# Patient Record
Sex: MALE | Race: WHITE | NOT HISPANIC OR LATINO | URBAN - METROPOLITAN AREA
[De-identification: names, ages, dates, MRNs, and addresses within clinical notes are randomized per-mention and may not be internally consistent; named-entity substitution may affect disease eponyms.]

---

## 2019-08-09 ENCOUNTER — INPATIENT (INPATIENT)
Facility: HOSPITAL | Age: 84
LOS: 3 days | Discharge: NURSING, REHAB OR LONG-TERM CARE | DRG: 193 | End: 2019-08-13
Attending: INTERNAL MEDICINE | Admitting: INTERNAL MEDICINE
Payer: MEDICARE

## 2019-08-09 VITALS
DIASTOLIC BLOOD PRESSURE: 100 MMHG | OXYGEN SATURATION: 85 % | TEMPERATURE: 99 F | HEART RATE: 79 BPM | RESPIRATION RATE: 22 BRPM | SYSTOLIC BLOOD PRESSURE: 198 MMHG

## 2019-08-09 DIAGNOSIS — J18.9 PNEUMONIA, UNSPECIFIED ORGANISM: ICD-10-CM

## 2019-08-09 DIAGNOSIS — G45.9 TRANSIENT CEREBRAL ISCHEMIC ATTACK, UNSPECIFIED: ICD-10-CM

## 2019-08-09 DIAGNOSIS — I21.21 ST ELEVATION (STEMI) MYOCARDIAL INFARCTION INVOLVING LEFT CIRCUMFLEX CORONARY ARTERY: ICD-10-CM

## 2019-08-09 DIAGNOSIS — J44.9 CHRONIC OBSTRUCTIVE PULMONARY DISEASE, UNSPECIFIED: ICD-10-CM

## 2019-08-09 DIAGNOSIS — I50.30 UNSPECIFIED DIASTOLIC (CONGESTIVE) HEART FAILURE: ICD-10-CM

## 2019-08-09 DIAGNOSIS — E11.9 TYPE 2 DIABETES MELLITUS WITHOUT COMPLICATIONS: ICD-10-CM

## 2019-08-09 DIAGNOSIS — J96.91 RESPIRATORY FAILURE, UNSPECIFIED WITH HYPOXIA: ICD-10-CM

## 2019-08-09 DIAGNOSIS — S42.009A FRACTURE OF UNSPECIFIED PART OF UNSPECIFIED CLAVICLE, INITIAL ENCOUNTER FOR CLOSED FRACTURE: ICD-10-CM

## 2019-08-09 DIAGNOSIS — E87.1 HYPO-OSMOLALITY AND HYPONATREMIA: ICD-10-CM

## 2019-08-09 DIAGNOSIS — I10 ESSENTIAL (PRIMARY) HYPERTENSION: ICD-10-CM

## 2019-08-09 DIAGNOSIS — Z91.89 OTHER SPECIFIED PERSONAL RISK FACTORS, NOT ELSEWHERE CLASSIFIED: ICD-10-CM

## 2019-08-09 DIAGNOSIS — R63.8 OTHER SYMPTOMS AND SIGNS CONCERNING FOOD AND FLUID INTAKE: ICD-10-CM

## 2019-08-09 DIAGNOSIS — N40.0 BENIGN PROSTATIC HYPERPLASIA WITHOUT LOWER URINARY TRACT SYMPTOMS: ICD-10-CM

## 2019-08-09 DIAGNOSIS — K57.92 DIVERTICULITIS OF INTESTINE, PART UNSPECIFIED, WITHOUT PERFORATION OR ABSCESS WITHOUT BLEEDING: Chronic | ICD-10-CM

## 2019-08-09 DIAGNOSIS — Z98.1 ARTHRODESIS STATUS: Chronic | ICD-10-CM

## 2019-08-09 LAB
ACANTHOCYTES BLD QL SMEAR: SLIGHT — SIGNIFICANT CHANGE UP
ALBUMIN SERPL ELPH-MCNC: 3.8 G/DL — SIGNIFICANT CHANGE UP (ref 3.3–5)
ALP SERPL-CCNC: 129 U/L — HIGH (ref 40–120)
ALT FLD-CCNC: 27 U/L — SIGNIFICANT CHANGE UP (ref 10–45)
ANION GAP SERPL CALC-SCNC: 12 MMOL/L — SIGNIFICANT CHANGE UP (ref 5–17)
ANISOCYTOSIS BLD QL: SLIGHT — SIGNIFICANT CHANGE UP
APTT BLD: 29.6 SEC — SIGNIFICANT CHANGE UP (ref 27.5–36.3)
AST SERPL-CCNC: 33 U/L — SIGNIFICANT CHANGE UP (ref 10–40)
BASOPHILS # BLD AUTO: 0.02 K/UL — SIGNIFICANT CHANGE UP (ref 0–0.2)
BASOPHILS NFR BLD AUTO: 0.3 % — SIGNIFICANT CHANGE UP (ref 0–2)
BILIRUB SERPL-MCNC: 0.3 MG/DL — SIGNIFICANT CHANGE UP (ref 0.2–1.2)
BUN SERPL-MCNC: 49 MG/DL — HIGH (ref 7–23)
BURR CELLS BLD QL SMEAR: SIGNIFICANT CHANGE UP
CALCIUM SERPL-MCNC: 10.4 MG/DL — SIGNIFICANT CHANGE UP (ref 8.4–10.5)
CHLORIDE SERPL-SCNC: 93 MMOL/L — LOW (ref 96–108)
CK MB CFR SERPL CALC: 4.7 NG/ML — SIGNIFICANT CHANGE UP (ref 0–6.7)
CO2 SERPL-SCNC: 26 MMOL/L — SIGNIFICANT CHANGE UP (ref 22–31)
CREAT SERPL-MCNC: 1.13 MG/DL — SIGNIFICANT CHANGE UP (ref 0.5–1.3)
ELLIPTOCYTES BLD QL SMEAR: SLIGHT — SIGNIFICANT CHANGE UP
EOSINOPHIL # BLD AUTO: 0.09 K/UL — SIGNIFICANT CHANGE UP (ref 0–0.5)
EOSINOPHIL NFR BLD AUTO: 0.9 % — SIGNIFICANT CHANGE UP (ref 0–6)
EOSINOPHIL NFR BLD AUTO: 1.3 % — SIGNIFICANT CHANGE UP (ref 0–6)
GAS PNL BLDA: SIGNIFICANT CHANGE UP
GAS PNL BLDV: SIGNIFICANT CHANGE UP
GLUCOSE SERPL-MCNC: 179 MG/DL — HIGH (ref 70–99)
HCT VFR BLD CALC: 27.7 % — LOW (ref 39–50)
HGB BLD-MCNC: 8.6 G/DL — LOW (ref 13–17)
IMM GRANULOCYTES NFR BLD AUTO: 0.4 % — SIGNIFICANT CHANGE UP (ref 0–1.5)
INR BLD: 1.17 — HIGH (ref 0.88–1.16)
LACTATE SERPL-SCNC: 1.2 MMOL/L — SIGNIFICANT CHANGE UP (ref 0.5–2)
LYMPHOCYTES # BLD AUTO: 0.64 K/UL — LOW (ref 1–3.3)
LYMPHOCYTES # BLD AUTO: 6.9 % — LOW (ref 13–44)
LYMPHOCYTES # BLD AUTO: 9.5 % — LOW (ref 13–44)
MANUAL SMEAR VERIFICATION: SIGNIFICANT CHANGE UP
MCHC RBC-ENTMCNC: 27.6 PG — SIGNIFICANT CHANGE UP (ref 27–34)
MCHC RBC-ENTMCNC: 31 GM/DL — LOW (ref 32–36)
MCV RBC AUTO: 88.8 FL — SIGNIFICANT CHANGE UP (ref 80–100)
MONOCYTES # BLD AUTO: 0.38 K/UL — SIGNIFICANT CHANGE UP (ref 0–0.9)
MONOCYTES NFR BLD AUTO: 4.3 % — SIGNIFICANT CHANGE UP (ref 2–14)
MONOCYTES NFR BLD AUTO: 5.7 % — SIGNIFICANT CHANGE UP (ref 2–14)
NEUTROPHILS # BLD AUTO: 5.55 K/UL — SIGNIFICANT CHANGE UP (ref 1.8–7.4)
NEUTROPHILS NFR BLD AUTO: 67 % — SIGNIFICANT CHANGE UP (ref 43–77)
NEUTROPHILS NFR BLD AUTO: 82.8 % — HIGH (ref 43–77)
NEUTS BAND # BLD: 20.9 % — HIGH
NRBC # BLD: 0 /100 WBCS — SIGNIFICANT CHANGE UP (ref 0–0)
NT-PROBNP SERPL-SCNC: 7977 PG/ML — HIGH (ref 0–300)
OVALOCYTES BLD QL SMEAR: SLIGHT — SIGNIFICANT CHANGE UP
PLAT MORPH BLD: ABNORMAL
PLATELET # BLD AUTO: 188 K/UL — SIGNIFICANT CHANGE UP (ref 150–400)
POIKILOCYTOSIS BLD QL AUTO: SIGNIFICANT CHANGE UP
POLYCHROMASIA BLD QL SMEAR: SLIGHT — SIGNIFICANT CHANGE UP
POTASSIUM SERPL-MCNC: 5.2 MMOL/L — SIGNIFICANT CHANGE UP (ref 3.5–5.3)
POTASSIUM SERPL-SCNC: 5.2 MMOL/L — SIGNIFICANT CHANGE UP (ref 3.5–5.3)
PROT SERPL-MCNC: 6.7 G/DL — SIGNIFICANT CHANGE UP (ref 6–8.3)
PROTHROM AB SERPL-ACNC: 13.3 SEC — HIGH (ref 10–12.9)
RBC # BLD: 3.12 M/UL — LOW (ref 4.2–5.8)
RBC # FLD: 13.7 % — SIGNIFICANT CHANGE UP (ref 10.3–14.5)
RBC BLD AUTO: ABNORMAL
SODIUM SERPL-SCNC: 131 MMOL/L — LOW (ref 135–145)
SPHEROCYTES BLD QL SMEAR: SLIGHT — SIGNIFICANT CHANGE UP
TROPONIN T SERPL-MCNC: 0.02 NG/ML — HIGH (ref 0–0.01)
WBC # BLD: 6.71 K/UL — SIGNIFICANT CHANGE UP (ref 3.8–10.5)
WBC # FLD AUTO: 6.71 K/UL — SIGNIFICANT CHANGE UP (ref 3.8–10.5)

## 2019-08-09 PROCEDURE — 99285 EMERGENCY DEPT VISIT HI MDM: CPT | Mod: 25,GC

## 2019-08-09 PROCEDURE — 71045 X-RAY EXAM CHEST 1 VIEW: CPT | Mod: 26

## 2019-08-09 PROCEDURE — 93010 ELECTROCARDIOGRAM REPORT: CPT

## 2019-08-09 PROCEDURE — 71275 CT ANGIOGRAPHY CHEST: CPT | Mod: 26

## 2019-08-09 PROCEDURE — 99223 1ST HOSP IP/OBS HIGH 75: CPT | Mod: GC

## 2019-08-09 PROCEDURE — 74177 CT ABD & PELVIS W/CONTRAST: CPT | Mod: 26

## 2019-08-09 RX ORDER — ACETAMINOPHEN 500 MG
1000 TABLET ORAL ONCE
Refills: 0 | Status: COMPLETED | OUTPATIENT
Start: 2019-08-09 | End: 2019-08-09

## 2019-08-09 RX ORDER — MIRABEGRON 50 MG/1
1 TABLET, EXTENDED RELEASE ORAL
Qty: 0 | Refills: 0 | DISCHARGE

## 2019-08-09 RX ORDER — ARMODAFINIL 200 MG/1
1 TABLET ORAL
Qty: 0 | Refills: 0 | DISCHARGE

## 2019-08-09 RX ORDER — LABETALOL HCL 100 MG
10 TABLET ORAL ONCE
Refills: 0 | Status: DISCONTINUED | OUTPATIENT
Start: 2019-08-09 | End: 2019-08-09

## 2019-08-09 RX ORDER — HALOPERIDOL DECANOATE 100 MG/ML
5 INJECTION INTRAMUSCULAR ONCE
Refills: 0 | Status: COMPLETED | OUTPATIENT
Start: 2019-08-09 | End: 2019-08-09

## 2019-08-09 RX ORDER — MORPHINE SULFATE 50 MG/1
1 CAPSULE, EXTENDED RELEASE ORAL ONCE
Refills: 0 | Status: DISCONTINUED | OUTPATIENT
Start: 2019-08-09 | End: 2019-08-09

## 2019-08-09 RX ORDER — SODIUM CHLORIDE 9 MG/ML
500 INJECTION INTRAMUSCULAR; INTRAVENOUS; SUBCUTANEOUS ONCE
Refills: 0 | Status: COMPLETED | OUTPATIENT
Start: 2019-08-09 | End: 2019-08-09

## 2019-08-09 RX ORDER — IPRATROPIUM/ALBUTEROL SULFATE 18-103MCG
3 AEROSOL WITH ADAPTER (GRAM) INHALATION EVERY 6 HOURS
Refills: 0 | Status: DISCONTINUED | OUTPATIENT
Start: 2019-08-09 | End: 2019-08-13

## 2019-08-09 RX ORDER — EMPAGLIFLOZIN, METFORMIN HYDROCHLORIDE 10; 1000 MG/1; MG/1
1 TABLET, EXTENDED RELEASE ORAL
Qty: 0 | Refills: 0 | DISCHARGE

## 2019-08-09 RX ORDER — LAMOTRIGINE 25 MG/1
1 TABLET, ORALLY DISINTEGRATING ORAL
Qty: 0 | Refills: 0 | DISCHARGE

## 2019-08-09 RX ORDER — FINASTERIDE 5 MG/1
1 TABLET, FILM COATED ORAL
Qty: 0 | Refills: 0 | DISCHARGE

## 2019-08-09 RX ORDER — MORPHINE SULFATE 50 MG/1
2 CAPSULE, EXTENDED RELEASE ORAL ONCE
Refills: 0 | Status: DISCONTINUED | OUTPATIENT
Start: 2019-08-09 | End: 2019-08-09

## 2019-08-09 RX ORDER — MORPHINE SULFATE 50 MG/1
2 CAPSULE, EXTENDED RELEASE ORAL EVERY 6 HOURS
Refills: 0 | Status: DISCONTINUED | OUTPATIENT
Start: 2019-08-09 | End: 2019-08-09

## 2019-08-09 RX ORDER — CLOPIDOGREL BISULFATE 75 MG/1
1 TABLET, FILM COATED ORAL
Qty: 0 | Refills: 0 | DISCHARGE

## 2019-08-09 RX ORDER — ATORVASTATIN CALCIUM 80 MG/1
1 TABLET, FILM COATED ORAL
Qty: 0 | Refills: 0 | DISCHARGE

## 2019-08-09 RX ORDER — IPRATROPIUM/ALBUTEROL SULFATE 18-103MCG
3 AEROSOL WITH ADAPTER (GRAM) INHALATION ONCE
Refills: 0 | Status: COMPLETED | OUTPATIENT
Start: 2019-08-09 | End: 2019-08-09

## 2019-08-09 RX ORDER — HYDRALAZINE HCL 50 MG
10 TABLET ORAL ONCE
Refills: 0 | Status: COMPLETED | OUTPATIENT
Start: 2019-08-09 | End: 2019-08-09

## 2019-08-09 RX ORDER — ATORVASTATIN CALCIUM 80 MG/1
80 TABLET, FILM COATED ORAL AT BEDTIME
Refills: 0 | Status: DISCONTINUED | OUTPATIENT
Start: 2019-08-09 | End: 2019-08-09

## 2019-08-09 RX ORDER — TRAZODONE HCL 50 MG
1.5 TABLET ORAL
Qty: 0 | Refills: 0 | DISCHARGE

## 2019-08-09 RX ORDER — MORPHINE SULFATE 50 MG/1
2 CAPSULE, EXTENDED RELEASE ORAL EVERY 4 HOURS
Refills: 0 | Status: DISCONTINUED | OUTPATIENT
Start: 2019-08-09 | End: 2019-08-10

## 2019-08-09 RX ORDER — ASPIRIN/CALCIUM CARB/MAGNESIUM 324 MG
325 TABLET ORAL DAILY
Refills: 0 | Status: DISCONTINUED | OUTPATIENT
Start: 2019-08-09 | End: 2019-08-09

## 2019-08-09 RX ORDER — LIDOCAINE 4 G/100G
1 CREAM TOPICAL DAILY
Refills: 0 | Status: DISCONTINUED | OUTPATIENT
Start: 2019-08-09 | End: 2019-08-12

## 2019-08-09 RX ADMIN — MORPHINE SULFATE 2 MILLIGRAM(S): 50 CAPSULE, EXTENDED RELEASE ORAL at 18:04

## 2019-08-09 RX ADMIN — Medication 400 MILLIGRAM(S): at 15:45

## 2019-08-09 RX ADMIN — Medication 3 MILLILITER(S): at 22:31

## 2019-08-09 RX ADMIN — MORPHINE SULFATE 2 MILLIGRAM(S): 50 CAPSULE, EXTENDED RELEASE ORAL at 22:22

## 2019-08-09 RX ADMIN — MORPHINE SULFATE 1 MILLIGRAM(S): 50 CAPSULE, EXTENDED RELEASE ORAL at 15:57

## 2019-08-09 RX ADMIN — MORPHINE SULFATE 2 MILLIGRAM(S): 50 CAPSULE, EXTENDED RELEASE ORAL at 22:33

## 2019-08-09 RX ADMIN — HALOPERIDOL DECANOATE 5 MILLIGRAM(S): 100 INJECTION INTRAMUSCULAR at 23:55

## 2019-08-09 RX ADMIN — Medication 1.25 MILLIGRAM(S): at 17:48

## 2019-08-09 RX ADMIN — Medication 1000 MILLIGRAM(S): at 18:05

## 2019-08-09 RX ADMIN — Medication 1.25 MILLIGRAM(S): at 13:24

## 2019-08-09 RX ADMIN — LIDOCAINE 1 PATCH: 4 CREAM TOPICAL at 18:05

## 2019-08-09 RX ADMIN — Medication 3 MILLILITER(S): at 13:24

## 2019-08-09 RX ADMIN — Medication 10 MILLIGRAM(S): at 21:55

## 2019-08-09 RX ADMIN — MORPHINE SULFATE 2 MILLIGRAM(S): 50 CAPSULE, EXTENDED RELEASE ORAL at 17:48

## 2019-08-09 RX ADMIN — SODIUM CHLORIDE 2000 MILLILITER(S): 9 INJECTION INTRAMUSCULAR; INTRAVENOUS; SUBCUTANEOUS at 12:29

## 2019-08-09 RX ADMIN — LIDOCAINE 1 PATCH: 4 CREAM TOPICAL at 17:36

## 2019-08-09 RX ADMIN — MORPHINE SULFATE 1 MILLIGRAM(S): 50 CAPSULE, EXTENDED RELEASE ORAL at 18:04

## 2019-08-09 NOTE — ED PROVIDER NOTE - PSH
Atherosclerosis of coronary artery  s/p stent placement  Diverticulitis    H/O spinal fusion    Other postprocedural status  perforated appendix, subsequent adhesion removal  Presence of cardiac pacemaker  S/P cardiac pacemaker procedure

## 2019-08-09 NOTE — ED PROVIDER NOTE - CLINICAL SUMMARY MEDICAL DECISION MAKING FREE TEXT BOX
89 y/o M PMH HTN, HLD, CAD s/p stents and pacemaker placement, COPD (not on home O2), who presents for SOB and HTN. CXR with large right sided consolidation. Increased work of breathing and using accessory muscles requiring BiPAP. Does not meet sepsis criteria. Received levaquin 750mg IV x1 and  500cc bolus. Systolic BP over 200 (did not take AM medications ) - gave enalaprilat 1.25mg IV x1. ICU consult called and recommending management on RMF as patient is DNR/DNI. Will admit to regional medical floor for further management.

## 2019-08-09 NOTE — H&P ADULT - NSHPPHYSICALEXAM_GEN_ALL_CORE
VITAL SIGNS:  T(C): 37.1 (08-09-19 @ 11:00), Max: 37.1 (08-09-19 @ 11:00)  T(F): 98.8 (08-09-19 @ 11:00), Max: 98.8 (08-09-19 @ 11:00)  HR: 87 (08-09-19 @ 14:09) (67 - 87)  BP: 198/76 (08-09-19 @ 14:09) (170/76 - 198/100)  BP(mean): --  RR: 20 (08-09-19 @ 14:09) (18 - 22)  SpO2: 100% (08-09-19 @ 14:09) (85% - 100%)  Wt(kg): --    PHYSICAL EXAM:  Constitutional: WDWN resting comfortably in bed; NAD  Head: NC/AT  Eyes: PERRL, EOMI, anicteric sclera  ENT: no nasal discharge; uvula midline, no oropharyngeal erythema or exudates; MMM  Neck: supple; no JVD or thyromegaly  Respiratory: CTA B/L; no W/R/R, no retractions  Cardiac: +S1/S2; RRR; no M/R/G; PMI non-displaced  Gastrointestinal: abdomen soft, NT/ND; no rebound or guarding; +BSx4  Back: spine midline, no bony tenderness or step-offs; no CVAT B/L  Extremities: WWP, no clubbing or cyanosis; no peripheral edema  Musculoskeletal: NROM x4; no joint swelling, tenderness or erythema  Vascular: 2+ radial, femoral, DP/PT pulses B/L  Dermatologic: skin warm, dry and intact; no rashes, wounds, or scars  Lymphatic: no submandibular or cervical LAD  Neurologic: AAOx3; CNII-XII grossly intact; no focal deficits  Psychiatric: affect and characteristics of appearance, verbalizations, behaviors are appropriate VITAL SIGNS:  T(C): 37.1 (08-09-19 @ 11:00), Max: 37.1 (08-09-19 @ 11:00)  T(F): 98.8 (08-09-19 @ 11:00), Max: 98.8 (08-09-19 @ 11:00)  HR: 87 (08-09-19 @ 14:09) (67 - 87)  BP: 198/76 (08-09-19 @ 14:09) (170/76 - 198/100)  BP(mean): --  RR: 20 (08-09-19 @ 14:09) (18 - 22)  SpO2: 100% (08-09-19 @ 14:09) (85% - 100%)  Wt(kg): --    PHYSICAL EXAM:  Constitutional: laying in mild distress, on high-flow oxygen  Head: NC/AT  Eyes: PERRL, EOMI, anicteric sclera  ENT: no nasal discharge  Neck: supple; no thyromegaly  Respiratory: transmitted breath sounds throughout lungs B/L  Cardiac: +S1/S2; RRR; no M/R/G; PMI non-displaced, PPM in place L sided chest  Gastrointestinal: abdomen soft, slightly distended, multiple firm nodules felt throughout abdomen, non-tender; no rebound or guarding; +BSx3  Back: spine midline, no bony tenderness or step-offs  Extremities: WWP, no clubbing or cyanosis; no peripheral edema, cold to the touch  Musculoskeletal: NROM x4; no joint swelling, R shoulder pain tenderness,   Vascular: 2+ radial, femoral  Dermatologic: skin warm, dry and intact; no rashes, wounds, or scars, 5-6cm ecchymosis midclavicular on R clavical  Lymphatic: no submandibular or cervical LAD  Neurologic: AAOx3; CNII-XII grossly intact; no focal deficits  Psychiatric: affect and characteristics of appearance, verbalizations, behaviors are appropriate

## 2019-08-09 NOTE — ED PROVIDER NOTE - PMH
Anxiety state  Anxiety  Atherosclerosis of coronary artery  CAD (coronary artery disease)  BPH (benign prostatic hyperplasia)    Chronic obstructive pulmonary disease  COPD (chronic obstructive pulmonary disease)  Depressive disorder  Depression  Diastolic CHF    DMII (diabetes mellitus, type 2)    Essential hypertension  HTN (hypertension)  History of arthritis  H/O: osteoarthritis  Personal history of other endocrine, metabolic, and immunity disorders  H/O hyperlipidemia  Presence of cardiac pacemaker  Cardiac pacemaker  ST elevation myocardial infarction involving left circumflex coronary artery    TIA (transient ischemic attack)    Type 2 diabetes mellitus  Diabetes

## 2019-08-09 NOTE — H&P ADULT - PROBLEM SELECTOR PLAN 4
history of COPD, with worsening cough and hypoxia Chest XR on admission showing R clavicular fracture. Patient fell from chair 5 days ago at home without LOC, did not seek medical attention as patient had full ROM at the time.   -  - Chest XR on admission showing R clavicular fracture. Patient fell from chair 5 days ago at home without LOC, did not seek medical attention as patient had full ROM at the time. CT showing R sided acute rib fractures  -  - Chest XR on admission showing R clavicular fracture. Patient fell from chair 5 days ago at home without LOC, did not seek medical attention as patient had full ROM at the time. CT showing R sided acute rib fractures  -pain control  -conservative management Chest XR on admission showing R clavicular fracture. Patient fell from chair 5 days ago at home without LOC, did not seek medical attention as patient had full ROM at the time. CT showing R sided acute rib fractures  -pain control  -conservative management  -will hold on surgical consult given family preference for comfort

## 2019-08-09 NOTE — ED PROVIDER NOTE - OBJECTIVE STATEMENT
89 y/o M with PMH HTN, HLD, CAD (s/p 2 stents), pacemaker, COPD who presents for SOB and HTN. 89 y/o M with PMH HTN, HLD, CAD (s/p 2 stents), pacemaker, COPD who presents for SOB and HTN. Daughters at bedside give history. For ~1 week he has been experiencing cough with some sputum production. No fevers at home. Additionally, over the past few days he has been more fatigued. At baseline, he is A&Ox3 however a little more confused over the past few days with some difficulty sleeping. About 10 days ago, he fell out of his chair. They called his PCP who said that he should be okay and did not have to go to the hospital. He was moving all of his extremities. Complaining of some pain in his right arm. Today, EMS was called when the patient said he was having difficulty breathing and could not get air in. His blood pressure was also elevated. EMS O2 sat 88-89% on RA.

## 2019-08-09 NOTE — H&P ADULT - PROBLEM SELECTOR PLAN 10
1) PCP Contacted on Admission: (Y/N) --> Dr. Peterson  2) Date of Contact with PCP: TBD  3) PCP Contacted at Discharge: TBD  4) Summary of Handoff Given to PCP: TBD   5) Post-Discharge Appointment Date: TBD

## 2019-08-09 NOTE — H&P ADULT - NSICDXPASTMEDICALHX_GEN_ALL_CORE_FT
PAST MEDICAL HISTORY:  Anxiety state Anxiety    Atherosclerosis of coronary artery CAD (coronary artery disease)    BPH (benign prostatic hyperplasia)     Chronic obstructive pulmonary disease COPD (chronic obstructive pulmonary disease)    Depressive disorder Depression    Diastolic CHF     DMII (diabetes mellitus, type 2)     Essential hypertension HTN (hypertension)    History of arthritis H/O: osteoarthritis    Personal history of other endocrine, metabolic, and immunity disorders H/O hyperlipidemia    Presence of cardiac pacemaker Cardiac pacemaker    ST elevation myocardial infarction involving left circumflex coronary artery     TIA (transient ischemic attack)     Type 2 diabetes mellitus Diabetes

## 2019-08-09 NOTE — H&P ADULT - PROBLEM SELECTOR PLAN 5
hyponatremic to 131  -  - hyponatremic to 131  -trend BMP history of HTN, elevated BP on admission 170-198/  -c/w enalaprilat   -currently holding PO medications in setting of BIPAP

## 2019-08-09 NOTE — ED PROVIDER NOTE - PHYSICAL EXAMINATION
Constitutional: Elderly male - appears to be sleeping. Tachypneic.   Head: NC/AT  Eyes: PERRL, EOMI, anicteric sclera  ENT: no nasal discharge; uvula midline, no oropharyngeal erythema or exudates; MMM  Neck: supple; no JVD or thyromegaly  Respiratory: Tachypneic with accessory muscle use. Diffuse rhonchi bilaterally R >L   Cardiac: +S1/S2; RRR; no M/R/G; PMI non-displaced  Gastrointestinal: abdomen soft, NT/ND; no rebound or guarding; +BSx4  Back: spine midline, no bony tenderness or step-offs; no CVAT B/L  Extremities: WWP, no clubbing or cyanosis; no peripheral edema  Musculoskeletal: NROM x4; no joint swelling, tenderness or erythema. Bruising noted over the right clavicle.   Vascular: 2+ radial, femoral, DP/PT pulses B/L  Dermatologic: skin warm, dry and intact; no rashes, wounds, or scars  Lymphatic: no submandibular or cervical LAD  Neurologic: Able to move all extremities. A&Ox1-2  Psychiatric: affect and characteristics of appearance, verbalizations, behaviors are appropriate

## 2019-08-09 NOTE — H&P ADULT - PROBLEM SELECTOR PLAN 9
F:  E:  N: 1) PCP Contacted on Admission: (Y/N) --> Dr. Peterson  2) Date of Contact with PCP: TBD  3) PCP Contacted at Discharge: TBD  4) Summary of Handoff Given to PCP: TBD   5) Post-Discharge Appointment Date: TBD F: no IVF  E: replete as needed  N: currently NPO in setting of BIPAP

## 2019-08-09 NOTE — H&P ADULT - PROBLEM SELECTOR PLAN 7
history of DM  -sliding scale history of dCHF, BNP 7977 on admission  -monitor fluid status closely  -holding PO medications in setting of BIPAP

## 2019-08-09 NOTE — H&P ADULT - NSHPSOCIALHISTORY_GEN_ALL_CORE
Patient lives with wife and a home health aid visit daily. Patient does not drink alcohol, former smoker quit smoking 30+ years ago, no illicit drug use

## 2019-08-09 NOTE — H&P ADULT - PROBLEM SELECTOR PLAN 2
Chest XR on admission showing R clavicular fracture. Patient fell from chair 5 days ago at home without LOC, did not seek medical attention as patient had full ROM at the time.   -  - patient presenting with worsening cough x1week with sputum production and shortness of breath since 1 day ago. Chest XR showing multifocal consolidation throughout R lung with small R pleural effusion. Was given levofloxacin 750mg IV  -  - patient presenting with worsening cough x1day with sputum production and shortness of breath since 1 day ago. Chest XR showing multifocal consolidation throughout R lung with small R pleural effusion. Was given levofloxacin 750mg IV  -c/w levofloxacin   - patient presenting with worsening cough x1day with sputum production and shortness of breath since 1 day ago. Chest XR showing multifocal consolidation throughout R lung with small R pleural effusion. Was given levofloxacin 750mg IV  -c/w levofloxacin   -follow cultures

## 2019-08-09 NOTE — ED PROVIDER NOTE - DIAGNOSTIC INTERPRETATION
Chest x-ray interpreted by ER Physician Dr. Alexis  Findings: heart size normal, large R sided pneumonia, no PTX, +R clavicle fracture Chest x-ray interpreted by ER Physician Dr. Alexis  Findings: heart size enlarged, large R sided pneumonia, no PTX, +R clavicle fracture

## 2019-08-09 NOTE — CONSULT NOTE ADULT - SUBJECTIVE AND OBJECTIVE BOX
89 y/o M with PMH HTN, HLD, CAD (s/p 2 stents), pacemaker, COPD who presents for SOB A 89 yo M with PMH HTN, HLD, CAD (s/p 2 stents), PPM, TIA 7/2015, Diastolic CHF (EF 65% in 2017), Dementia (AOx3 at baseline), COPD who presents for worsening cough for 1 week and SOB. Pt was in normal state of health 2 weeks ago and fell from chair after reaching for object. Family denies LOC, head trauma, no symptoms at the time. However over the past week ICU CONSULT    89M with PMH HTN, HLD, CAD (s/p 2 stents), PPM, TIA 7/2015, Diastolic CHF (EF 65% in 2017), Dementia (AOx3 at baseline), COPD (not on home O2) who presents for worsening cough for 1 week and SOB. Pt was in normal state of health 2 weeks ago and fell from chair after reaching for object. Family denies LOC, head trauma, no symptoms at the time. No further imaging was performed. However over the past week, pt with increasing fatigue, weakness per family. Symptoms associated symptoms of increased SOB x1 day and cough productive of clear nonbloody mucus. Per family, no fevers or chills, c/o HA, CP, palpitations, abdominal pain, n/v, diarrhea, constipation urinary sxs. Pt normally AAOx3 and independent living with wife at home previously. In ED, pt found to be hypoxic to 85% on room air with increased WOB. Placed on BiPAP with some improvement though increased lethargy and confusion. ICU consulted for respiratory failure.    Pt seen and examined at bedside. AAOx1 to self only. Appearing lethargic while on BiPAP. Arousable to voice and following commands appropriately. Denies CP, HA, abdominal pain, n/v. Reports improvement in his breathing and feel better since arrival.       PMHx - as per HPI  PSx - s/p hernia repair with multiple ELOY procedures    Meds - lexapro 20mg, nuvigil 150 mg, symbicort 1 puff BID, aspirin 325, lipitor 80, calcium 500, labetalol 200 BID, enalipril 10 mg qd, synjardy 12.5/500 BID, finasteride 5mg, norvasc 5 mg qd, lamictal 100 mg tramadol 325/37.5 (2 tabs daily), tamsulosin 0.8 mg qd, trazodone 150 mg qhs,  ingrezza 40 mg  Allergies - augmentin--thrombocytopenia; keflex--hives/rash   FHx - HTN  Sx - previous smoker ~1ppd, no alcohol or recreational drugs       PHYSICAL EXAM   Vital Signs Last 24 Hrs  T(C): 36.4 (09 Aug 2019 17:43), Max: 37.1 (09 Aug 2019 11:00)  T(F): 97.5 (09 Aug 2019 17:43), Max: 98.8 (09 Aug 2019 11:00)  HR: 62 (09 Aug 2019 17:43) (62 - 87)  BP: 212/73 (09 Aug 2019 17:43) (170/76 - 212/73)  BP(mean): --  RR: 18 (09 Aug 2019 17:43) (18 - 22)  SpO2: 100% (09 Aug 2019 17:43) (85% - 100%)      General - laying in bed on BiPAP appearing somewhat uncomfortable, lethargic, pale, thin, cachectic, ill-appearing  HEENT - MM dry, EOMI, PERRL, neck supple, no JVD  CV - RRR, no M/R/G  Resp - decreased brath sounds on R middle and lower lobes, L side CTA,, rhonchi and expiratory wheezing b/l, no rales; lethargic on BiPAP with minimal accessory muscle use    Abdomen - soft, mildly distended with reproducible ventral hernia on L sided abd, nontender, no rebound or guarding, +BS throughout   Extremities - no cyanosis or edema, pale nailbeds, peripheral pulses intact, cool fingers and toes   Skin - clean, dry, intact, no rashes or lesions       LABS                        8.6    6.71  )-----------( 188      ( 09 Aug 2019 11:52 )             27.7     08-09    131<L>  |  93<L>  |  49<H>  ----------------------------<  179<H>  5.2   |  26  |  1.13    Ca    10.4      09 Aug 2019 11:52    TPro  6.7  /  Alb  3.8  /  TBili  0.3  /  DBili  x   /  AST  33  /  ALT  27  /  AlkPhos  129<H>  08-09    PT/INR - ( 09 Aug 2019 11:52 )   PT: 13.3 sec;   INR: 1.17          PTT - ( 09 Aug 2019 11:52 )  PTT:29.6 sec  Lactate, Blood: 1.2 mmoL/L (08-09-19 @ 12:24)            IMAGING   CXR - R sided multifocal PNA, L clavicle fracture

## 2019-08-09 NOTE — H&P ADULT - HISTORY OF PRESENT ILLNESS
Source of information are daughters.   89 year old M with COPD, HTN, HLD, CAD (s/p 2 stents), dCHF, TIA (2015), PPM, BPH, DM2, and history of MI (2012) who presenting with shortness of breath, worsening cough, and clavicular fracture s/p mechanical fall 5 days ago. Patient fell at home from chair 5 days ago but did not go to the hospital for the incident, daughters report that he had full ROM of both shoulders. There was no LOC, daughters were not present at the time of the fall. One day ago he began to have worsening chronic cough with some sputum production. Patient complains of R shoulder pain and feeling cold. No fever, chills, chest pain, abdominal pain, or urinary symptoms. Patient has an extensive medication list. Patient lives at home with wife with a home health aid that visits daily. He uses a wheelchair to move around.   Daughters aware and in agreement with respecting patient's wishes of being DNR/DNI.     In the ED patient presented with T 98.8, HR 67-87, RR 18-22, SpO2 85 RA - 100 hi-flow. WBC 6.71, hgb 8.6, Na 131, BUN 49, Alk-P 129, BNP 7977. CT head negative, Chest XR significant for multifocal consolidation throughout R lung, small R pleural effusion. In the ED he was given enalaprilat 1.25mg IV, levofloxacin 750mg IV, 500ml NS, and duoneb. Source of information are daughters.   89 year old M with COPD, HTN, HLD, CAD (s/p 2 stents), dCHF, TIA (2015), PPM, BPH, DM2, and history of MI (2012) who presenting with shortness of breath, worsening cough, and clavicular fracture s/p mechanical fall 5 days ago. Patient fell at home from chair 5 days ago but did not go to the hospital for the incident, daughters report that he had full ROM of both shoulders. There was no LOC, daughters were not present at the time of the fall. One day ago he began to have worsening chronic cough with some sputum production. Patient complains of R shoulder pain and feeling cold. No fever, chills, chest pain, abdominal pain, or urinary symptoms. Patient has an extensive medication list. Patient lives at home with wife with a home health aid that visits daily. He uses a wheelchair to move around.   Daughters aware and in agreement with respecting patient's wishes of being DNR/DNI. Last visit was in 2016 for AMS    In the ED patient presented with T 98.8, HR 67-87, RR 18-22, SpO2 85 RA - 100 hi-flow. WBC 6.71, hgb 8.6, Na 131, BUN 49, Alk-P 129, BNP 7977. CT head negative, Chest XR significant for multifocal consolidation throughout R lung, small R pleural effusion. In the ED he was given enalaprilat 1.25mg IV, levofloxacin 750mg IV, 500ml NS, and duoneb.

## 2019-08-09 NOTE — H&P ADULT - ASSESSMENT
89 year old M with COPD, HTN, HLD, CAD (s/p 2 stents), dCHF, TIA (2015), PPM, BPH, DM2, and history of MI (2012) who presenting with shortness of breath, worsening cough, and clavicular fracture s/p mechanical fall 5 days ago being admitted for pneumonia and clavicular fracture 89 year old M with COPD, HTN, HLD, CAD (s/p 2 stents), dCHF, TIA (2015), PPM, BPH, DM2, and history of MI (2012) who presenting with shortness of breath, worsening cough, and clavicular fracture s/p mechanical fall 5 days ago being admitted for multifocal pneumonia and clavicular fracture

## 2019-08-09 NOTE — PATIENT PROFILE ADULT - ABILITY TO HEAR (WITH HEARING AID OR HEARING APPLIANCE IF NORMALLY USED):
b/l hearing aids, not in place/Mildly to Moderately Impaired: difficulty hearing in some environments or speaker may need to increase volume or speak distinctly

## 2019-08-09 NOTE — H&P ADULT - NSICDXPASTSURGICALHX_GEN_ALL_CORE_FT
PAST SURGICAL HISTORY:  Atherosclerosis of coronary artery s/p stent placement    Diverticulitis     H/O spinal fusion     Other postprocedural status perforated appendix, subsequent adhesion removal    Presence of cardiac pacemaker S/P cardiac pacemaker procedure

## 2019-08-09 NOTE — H&P ADULT - PROBLEM SELECTOR PROBLEM 8
ST elevation myocardial infarction involving left circumflex coronary artery Nutrition, metabolism, and development symptoms DMII (diabetes mellitus, type 2)

## 2019-08-09 NOTE — ED ADULT NURSE NOTE - OBJECTIVE STATEMENT
Patient BIBA accompanied by family member for sob , as per family member he is been coughing for 1 week now with whitish phlegm , no fever nor chills   . Also pt. has not been having a good hrs of sleep for few weeks now , woke up with sob with chest tightness this am . Placed on cardiac monitor and pulse oximeter on NRFM 100% .  Denies any pain . Not in respiratory distress . Awaiting to be evaluated , will continue to monitor .

## 2019-08-09 NOTE — H&P ADULT - PROBLEM SELECTOR PLAN 6
history of dCHF history of dCHF, BNP 7977 on admission hyponatremic to 131  -trend BMP  -monitor fluid status daily hyponatremic to 131  -trend BMP  -monitor fluid status daily  fluid restriction

## 2019-08-09 NOTE — ED ADULT NURSE NOTE - NSIMPLEMENTINTERV_GEN_ALL_ED
Implemented All Fall with Harm Risk Interventions:  [place] to call system. Call bell, personal items and telephone within reach. Instruct patient to call for assistance. Room bathroom lighting operational. Non-slip footwear when patient is off stretcher. Physically safe environment: no spills, clutter or unnecessary equipment. Stretcher in lowest position, wheels locked, appropriate side rails in place. Provide visual cue, wrist band, yellow gown, etc. Monitor gait and stability. Monitor for mental status changes and reorient to person, place, and time. Review medications for side effects contributing to fall risk. Reinforce activity limits and safety measures with patient and family. Provide visual clues: red socks.

## 2019-08-09 NOTE — ED ADULT TRIAGE NOTE - OTHER COMPLAINTS
EMS called by family member, patient reports only weakness. Coarse crackles and cough noted. patient unable to give any hx. Hypoxic on arrival to 85%, 96% on NRB.

## 2019-08-09 NOTE — H&P ADULT - NSHPLABSRESULTS_GEN_ALL_CORE
LABS:                         8.6    6.71  )-----------( 188      ( 09 Aug 2019 11:52 )             27.7     08-09    131<L>  |  93<L>  |  49<H>  ----------------------------<  179<H>  5.2   |  26  |  1.13    Ca    10.4      09 Aug 2019 11:52    TPro  6.7  /  Alb  3.8  /  TBili  0.3  /  DBili  x   /  AST  33  /  ALT  27  /  AlkPhos  129<H>  08-09    PT/INR - ( 09 Aug 2019 11:52 )   PT: 13.3 sec;   INR: 1.17          PTT - ( 09 Aug 2019 11:52 )  PTT:29.6 sec    CARDIAC MARKERS ( 09 Aug 2019 11:52 )  x     / 0.02 ng/mL / 129 U/L / x     / 4.7 ng/mL      Serum Pro-Brain Natriuretic Peptide: 7977 pg/mL (08-09 @ 11:52)    Lactate, Blood: 1.2 mmoL/L (08-09 @ 12:24)      RADIOLOGY, EKG & ADDITIONAL TESTS: Reviewed. LABS:                         8.6    6.71  )-----------( 188      ( 09 Aug 2019 11:52 )             27.7     08-09    131<L>  |  93<L>  |  49<H>  ----------------------------<  179<H>  5.2   |  26  |  1.13    Ca    10.4      09 Aug 2019 11:52    TPro  6.7  /  Alb  3.8  /  TBili  0.3  /  DBili  x   /  AST  33  /  ALT  27  /  AlkPhos  129<H>  08-09    PT/INR - ( 09 Aug 2019 11:52 )   PT: 13.3 sec;   INR: 1.17          PTT - ( 09 Aug 2019 11:52 )  PTT:29.6 sec    CARDIAC MARKERS ( 09 Aug 2019 11:52 )  x     / 0.02 ng/mL / 129 U/L / x     / 4.7 ng/mL      Serum Pro-Brain Natriuretic Peptide: 7977 pg/mL (08-09 @ 11:52)    Lactate, Blood: 1.2 mmoL/L (08-09 @ 12:24)      < from: CT Abdomen and Pelvis w/ IV Cont (08.09.19 @ 15:21) >    FINDINGS:    CHEST:    CHEST WALL AND LOWER NECK: Within normal limits  MEDIASTINUM AND DEVEN: Within normal limits  HEART: Within normal limits  VESSELS: No central pulmonary embolism. Subsegmental branches is limited   by motion.  LUNG AND LARGE AIRWAYS: Bibasilar endobronchial mucous. Bilateral   perihilar right greater than left airspace opacities. Multiple nodular   opacities right upper lobe. Small right pleural effusion. No pneumothorax.    ABDOMEN:  LIVER: Within normal limits  BILE DUCTS: Normal caliber  GALLBLADDER: No calcified gallstones. Normal caliber wall  PANCREAS: within normal limits  SPLEEN: within normal limits  ADRENALS: within normal limits  KIDNEYS: Multiple cysts    PELVIS:  REPRODUCTIVE ORGANS: Enlarged prostate  BLADDER: within normal limits    BOWEL: Large fecal load in the colon. Sigmoid diverticulosis.  PERITONEUM: No ascites or free air, no fluid collection    VESSELS: Diffuse arterial calcifications. No occlusive mesenteric   thrombus.  RETROPERITONEUM: No enlarged retroperitoneal or pelvic nodes  ABDOMINAL WALL: Within normal limits  BONES: Multiple acute fractures of the right posterior third through   ninth and right anterior fourth through eighth ribs. Multiple old   left-sided rib fractures.    IMPRESSION:     No central pulmonary embolism.  Multifocal pneumonia.    Multiple right-sided rib fractures as described, correlate for clinical   chest. Small right pleural effusion. No pneumothorax.    No acute findings abdomen pelvis. Incidental comments as above.    < end of copied text >    < from: CT Angio Chest PE Protocol w/ IV Cont (08.09.19 @ 15:21) >    IMPRESSION:     No central pulmonary embolism.    Multifocal pneumonia.    Multiple right-sided rib fractures as described, correlate for clinical   chest. Small right pleural effusion. No pneumothorax.    No acute findings abdomen pelvis. Incidental comments as above    < end of copied text >

## 2019-08-09 NOTE — CHART NOTE - NSCHARTNOTEFT_GEN_A_CORE
Discussed goals of care with Shaina Young (patient's spouse). Patient's wishes are Discussed goals of care with Shaina Young (patient's spouse). Patient wants to be DNI/DNR. No NG tube. Okay with bipap and antibiotics for now. No escalation of care, MEWS exemption order placed. Discussed goals of care with Shaina Young (patient's spouse). Patient wants to be DNI/DNR, requesting palliative care. No NG tube. Okay with bipap and antibiotics for now. No escalation of care, MEWS exemption order placed.

## 2019-08-09 NOTE — H&P ADULT - PROBLEM SELECTOR PLAN 1
patient presenting with worsening cough x1week with sputum production and shortness of breath since 1 day ago. Chest XR showing multifocal consolidation throughout R lung with small R pleural effusion. Was given levofloxacin 750mg IV  -  - hypoxic respiratory failure from multifocal pneumonia. Satig at 85 at initial presentation. Patient is DNR/DNI  -BIBPAP  -Palliative recs hypoxic respiratory failure from multifocal pneumonia. Sating at 85 at initial presentation. Patient is DNR/DNI  -c/w BIBPAP  -Palliative recs hypoxic respiratory failure from multifocal pneumonia. Sating at 85 at initial presentation. Patient is DNR/DNI  -c/w BIBPAP  -Palliative recs  fitted the bipap mask and will attempt to change to HFNC in am

## 2019-08-10 DIAGNOSIS — J43.2 CENTRILOBULAR EMPHYSEMA: ICD-10-CM

## 2019-08-10 DIAGNOSIS — I10 ESSENTIAL (PRIMARY) HYPERTENSION: ICD-10-CM

## 2019-08-10 DIAGNOSIS — E11.9 TYPE 2 DIABETES MELLITUS WITHOUT COMPLICATIONS: ICD-10-CM

## 2019-08-10 DIAGNOSIS — J96.01 ACUTE RESPIRATORY FAILURE WITH HYPOXIA: ICD-10-CM

## 2019-08-10 LAB
ANION GAP SERPL CALC-SCNC: 13 MMOL/L — SIGNIFICANT CHANGE UP (ref 5–17)
BUN SERPL-MCNC: 37 MG/DL — HIGH (ref 7–23)
CALCIUM SERPL-MCNC: 9.9 MG/DL — SIGNIFICANT CHANGE UP (ref 8.4–10.5)
CHLORIDE SERPL-SCNC: 97 MMOL/L — SIGNIFICANT CHANGE UP (ref 96–108)
CO2 SERPL-SCNC: 24 MMOL/L — SIGNIFICANT CHANGE UP (ref 22–31)
CREAT SERPL-MCNC: 1.12 MG/DL — SIGNIFICANT CHANGE UP (ref 0.5–1.3)
GLUCOSE SERPL-MCNC: 97 MG/DL — SIGNIFICANT CHANGE UP (ref 70–99)
HCT VFR BLD CALC: 27.5 % — LOW (ref 39–50)
HGB BLD-MCNC: 8.7 G/DL — LOW (ref 13–17)
MCHC RBC-ENTMCNC: 27.3 PG — SIGNIFICANT CHANGE UP (ref 27–34)
MCHC RBC-ENTMCNC: 31.6 GM/DL — LOW (ref 32–36)
MCV RBC AUTO: 86.2 FL — SIGNIFICANT CHANGE UP (ref 80–100)
NRBC # BLD: 0 /100 WBCS — SIGNIFICANT CHANGE UP (ref 0–0)
PLATELET # BLD AUTO: 195 K/UL — SIGNIFICANT CHANGE UP (ref 150–400)
POTASSIUM SERPL-MCNC: 4.6 MMOL/L — SIGNIFICANT CHANGE UP (ref 3.5–5.3)
POTASSIUM SERPL-SCNC: 4.6 MMOL/L — SIGNIFICANT CHANGE UP (ref 3.5–5.3)
RBC # BLD: 3.19 M/UL — LOW (ref 4.2–5.8)
RBC # FLD: 13.8 % — SIGNIFICANT CHANGE UP (ref 10.3–14.5)
SODIUM SERPL-SCNC: 134 MMOL/L — LOW (ref 135–145)
WBC # BLD: 6.15 K/UL — SIGNIFICANT CHANGE UP (ref 3.8–10.5)
WBC # FLD AUTO: 6.15 K/UL — SIGNIFICANT CHANGE UP (ref 3.8–10.5)

## 2019-08-10 PROCEDURE — 99232 SBSQ HOSP IP/OBS MODERATE 35: CPT | Mod: GC

## 2019-08-10 RX ORDER — LABETALOL HCL 100 MG
10 TABLET ORAL ONCE
Refills: 0 | Status: COMPLETED | OUTPATIENT
Start: 2019-08-10 | End: 2019-08-10

## 2019-08-10 RX ORDER — HYDRALAZINE HCL 50 MG
5 TABLET ORAL ONCE
Refills: 0 | Status: DISCONTINUED | OUTPATIENT
Start: 2019-08-10 | End: 2019-08-10

## 2019-08-10 RX ORDER — HALOPERIDOL DECANOATE 100 MG/ML
5 INJECTION INTRAMUSCULAR ONCE
Refills: 0 | Status: COMPLETED | OUTPATIENT
Start: 2019-08-10 | End: 2019-08-10

## 2019-08-10 RX ORDER — LIDOCAINE 4 G/100G
1 CREAM TOPICAL EVERY 24 HOURS
Refills: 0 | Status: DISCONTINUED | OUTPATIENT
Start: 2019-08-10 | End: 2019-08-13

## 2019-08-10 RX ORDER — SODIUM CHLORIDE 9 MG/ML
1000 INJECTION INTRAMUSCULAR; INTRAVENOUS; SUBCUTANEOUS
Refills: 0 | Status: DISCONTINUED | OUTPATIENT
Start: 2019-08-10 | End: 2019-08-10

## 2019-08-10 RX ORDER — LAMOTRIGINE 25 MG/1
100 TABLET, ORALLY DISINTEGRATING ORAL DAILY
Refills: 0 | Status: DISCONTINUED | OUTPATIENT
Start: 2019-08-10 | End: 2019-08-13

## 2019-08-10 RX ORDER — HYDRALAZINE HCL 50 MG
5 TABLET ORAL ONCE
Refills: 0 | Status: COMPLETED | OUTPATIENT
Start: 2019-08-10 | End: 2019-08-10

## 2019-08-10 RX ORDER — MORPHINE SULFATE 50 MG/1
2 CAPSULE, EXTENDED RELEASE ORAL
Refills: 0 | Status: DISCONTINUED | OUTPATIENT
Start: 2019-08-10 | End: 2019-08-11

## 2019-08-10 RX ADMIN — Medication 1.25 MILLIGRAM(S): at 12:42

## 2019-08-10 RX ADMIN — Medication 3 MILLILITER(S): at 05:52

## 2019-08-10 RX ADMIN — HALOPERIDOL DECANOATE 5 MILLIGRAM(S): 100 INJECTION INTRAMUSCULAR at 22:15

## 2019-08-10 RX ADMIN — LIDOCAINE 1 PATCH: 4 CREAM TOPICAL at 20:20

## 2019-08-10 RX ADMIN — Medication 1.25 MILLIGRAM(S): at 00:22

## 2019-08-10 RX ADMIN — Medication 3 MILLILITER(S): at 22:19

## 2019-08-10 RX ADMIN — MORPHINE SULFATE 2 MILLIGRAM(S): 50 CAPSULE, EXTENDED RELEASE ORAL at 14:45

## 2019-08-10 RX ADMIN — MORPHINE SULFATE 2 MILLIGRAM(S): 50 CAPSULE, EXTENDED RELEASE ORAL at 18:02

## 2019-08-10 RX ADMIN — Medication 1.25 MILLIGRAM(S): at 18:10

## 2019-08-10 RX ADMIN — MORPHINE SULFATE 2 MILLIGRAM(S): 50 CAPSULE, EXTENDED RELEASE ORAL at 21:29

## 2019-08-10 RX ADMIN — LIDOCAINE 1 PATCH: 4 CREAM TOPICAL at 20:18

## 2019-08-10 RX ADMIN — Medication 5 MILLIGRAM(S): at 22:07

## 2019-08-10 RX ADMIN — MORPHINE SULFATE 2 MILLIGRAM(S): 50 CAPSULE, EXTENDED RELEASE ORAL at 21:14

## 2019-08-10 RX ADMIN — MORPHINE SULFATE 2 MILLIGRAM(S): 50 CAPSULE, EXTENDED RELEASE ORAL at 06:33

## 2019-08-10 RX ADMIN — Medication 1.25 MILLIGRAM(S): at 05:57

## 2019-08-10 RX ADMIN — LIDOCAINE 1 PATCH: 4 CREAM TOPICAL at 17:46

## 2019-08-10 RX ADMIN — LIDOCAINE 1 PATCH: 4 CREAM TOPICAL at 14:02

## 2019-08-10 RX ADMIN — MORPHINE SULFATE 2 MILLIGRAM(S): 50 CAPSULE, EXTENDED RELEASE ORAL at 14:29

## 2019-08-10 RX ADMIN — Medication 10 MILLIGRAM(S): at 18:53

## 2019-08-10 RX ADMIN — Medication 3 MILLILITER(S): at 11:25

## 2019-08-10 RX ADMIN — LAMOTRIGINE 100 MILLIGRAM(S): 25 TABLET, ORALLY DISINTEGRATING ORAL at 18:55

## 2019-08-10 RX ADMIN — MORPHINE SULFATE 2 MILLIGRAM(S): 50 CAPSULE, EXTENDED RELEASE ORAL at 07:32

## 2019-08-10 RX ADMIN — MORPHINE SULFATE 2 MILLIGRAM(S): 50 CAPSULE, EXTENDED RELEASE ORAL at 10:45

## 2019-08-10 RX ADMIN — SODIUM CHLORIDE 50 MILLILITER(S): 9 INJECTION INTRAMUSCULAR; INTRAVENOUS; SUBCUTANEOUS at 15:37

## 2019-08-10 RX ADMIN — MORPHINE SULFATE 2 MILLIGRAM(S): 50 CAPSULE, EXTENDED RELEASE ORAL at 18:17

## 2019-08-10 RX ADMIN — Medication 3 MILLILITER(S): at 15:38

## 2019-08-10 RX ADMIN — LIDOCAINE 1 PATCH: 4 CREAM TOPICAL at 05:51

## 2019-08-10 RX ADMIN — MORPHINE SULFATE 2 MILLIGRAM(S): 50 CAPSULE, EXTENDED RELEASE ORAL at 10:25

## 2019-08-10 NOTE — PROGRESS NOTE ADULT - ATTENDING COMMENTS
Patient seen and examined with house-staff during bedside rounds.  Resident note read, including vitals, physical findings, laboratory data, and radiological reports.   Revisions included below.  Direct personal management at bed side and extensive interpretation of the data.  Plan was outlined and discussed in details with the housestaff.  Decision making of high complexity  Action taken for acute disease activity to reflect the level of care provided:  - medication reconciliation  - review laboratory data  Discussed with family

## 2019-08-10 NOTE — PROGRESS NOTE ADULT - SUBJECTIVE AND OBJECTIVE BOX
Interval Events: Reviewed  Patient seen and examined at bedside.    Patient is a 89y old  Male who presents with a chief complaint of Multifocal pneumonia (09 Aug 2019 15:15)    he is agitated and does not want the cpap  PAST MEDICAL & SURGICAL HISTORY:  TIA (transient ischemic attack)  Diastolic CHF  BPH (benign prostatic hyperplasia)  DMII (diabetes mellitus, type 2)  ST elevation myocardial infarction involving left circumflex coronary artery  Chronic obstructive pulmonary disease: COPD (chronic obstructive pulmonary disease)  Essential hypertension: HTN (hypertension)  Type 2 diabetes mellitus: Diabetes  History of arthritis: H/O: osteoarthritis  Presence of cardiac pacemaker: Cardiac pacemaker  Atherosclerosis of coronary artery: CAD (coronary artery disease)  Personal history of other endocrine, metabolic, and immunity disorders: H/O hyperlipidemia  Depressive disorder: Depression  Anxiety state: Anxiety  H/O spinal fusion  Diverticulitis  Other postprocedural status: perforated appendix, subsequent adhesion removal  Atherosclerosis of coronary artery: s/p stent placement  Presence of cardiac pacemaker: S/P cardiac pacemaker procedure      MEDICATIONS:  Pulmonary:  ALBUTerol/ipratropium for Nebulization 3 milliLiter(s) Nebulizer every 6 hours    Antimicrobials:  levoFLOXacin IVPB 750 milliGRAM(s) IV Intermittent every 24 hours    Anticoagulants:    Cardiac:  enalaprilat Injectable 1.25 milliGRAM(s) IV Push every 6 hours      Allergies    Augmentin (Rash)  Keflex (Unknown)    Intolerances        Vital Signs Last 24 Hrs  T(C): 37.4 (10 Aug 2019 21:10), Max: 37.4 (10 Aug 2019 21:10)  T(F): 99.3 (10 Aug 2019 21:10), Max: 99.3 (10 Aug 2019 21:10)  HR: 74 (10 Aug 2019 21:10) (65 - 97)  BP: 213/86 (10 Aug 2019 21:10) (176/80 - 220/88)  BP(mean): --  RR: 18 (10 Aug 2019 21:10) (16 - 20)  SpO2: 99% (10 Aug 2019 21:10) (96% - 99%)        LABS:  ABG - ( 09 Aug 2019 14:18 )  pH, Arterial: 7.37  pH, Blood: x     /  pCO2: 53    /  pO2: 26    / HCO3: 30    / Base Excess: 4.1   /  SaO2: 36                  CBC Full  -  ( 10 Aug 2019 07:17 )  WBC Count : 6.15 K/uL  RBC Count : 3.19 M/uL  Hemoglobin : 8.7 g/dL  Hematocrit : 27.5 %  Platelet Count - Automated : 195 K/uL  Mean Cell Volume : 86.2 fl  Mean Cell Hemoglobin : 27.3 pg  Mean Cell Hemoglobin Concentration : 31.6 gm/dL  Auto Neutrophil # : x  Auto Lymphocyte # : x  Auto Monocyte # : x  Auto Eosinophil # : x  Auto Basophil # : x  Auto Neutrophil % : x  Auto Lymphocyte % : x  Auto Monocyte % : x  Auto Eosinophil % : x  Auto Basophil % : x    08-10    134<L>  |  97  |  37<H>  ----------------------------<  97  4.6   |  24  |  1.12    Ca    9.9      10 Aug 2019 07:17    TPro  6.7  /  Alb  3.8  /  TBili  0.3  /  DBili  x   /  AST  33  /  ALT  27  /  AlkPhos  129<H>  08-09    PT/INR - ( 09 Aug 2019 11:52 )   PT: 13.3 sec;   INR: 1.17          PTT - ( 09 Aug 2019 11:52 )  PTT:29.6 sec                Culture Results:   No growth at 1 day. (08-09 @ 12:06)  Culture Results:   No growth at 1 day. (08-09 @ 12:06)      RADIOLOGY & ADDITIONAL STUDIES (The following images were personally reviewed):  Henriquez:                                     No  Urine output:                       adequate  DVT prophylaxis:                 Yes  Flattus:                                  Yes  Bowel movement:              No

## 2019-08-11 LAB
ANION GAP SERPL CALC-SCNC: 12 MMOL/L — SIGNIFICANT CHANGE UP (ref 5–17)
BUN SERPL-MCNC: 37 MG/DL — HIGH (ref 7–23)
CALCIUM SERPL-MCNC: 10.1 MG/DL — SIGNIFICANT CHANGE UP (ref 8.4–10.5)
CHLORIDE SERPL-SCNC: 99 MMOL/L — SIGNIFICANT CHANGE UP (ref 96–108)
CO2 SERPL-SCNC: 23 MMOL/L — SIGNIFICANT CHANGE UP (ref 22–31)
CREAT SERPL-MCNC: 1.09 MG/DL — SIGNIFICANT CHANGE UP (ref 0.5–1.3)
GLUCOSE SERPL-MCNC: 101 MG/DL — HIGH (ref 70–99)
HCT VFR BLD CALC: 31.1 % — LOW (ref 39–50)
HGB BLD-MCNC: 9.8 G/DL — LOW (ref 13–17)
MAGNESIUM SERPL-MCNC: 2.3 MG/DL — SIGNIFICANT CHANGE UP (ref 1.6–2.6)
MCHC RBC-ENTMCNC: 27.7 PG — SIGNIFICANT CHANGE UP (ref 27–34)
MCHC RBC-ENTMCNC: 31.5 GM/DL — LOW (ref 32–36)
MCV RBC AUTO: 87.9 FL — SIGNIFICANT CHANGE UP (ref 80–100)
NRBC # BLD: 0 /100 WBCS — SIGNIFICANT CHANGE UP (ref 0–0)
PLATELET # BLD AUTO: 223 K/UL — SIGNIFICANT CHANGE UP (ref 150–400)
POTASSIUM SERPL-MCNC: 4.4 MMOL/L — SIGNIFICANT CHANGE UP (ref 3.5–5.3)
POTASSIUM SERPL-SCNC: 4.4 MMOL/L — SIGNIFICANT CHANGE UP (ref 3.5–5.3)
RBC # BLD: 3.54 M/UL — LOW (ref 4.2–5.8)
RBC # FLD: 13.6 % — SIGNIFICANT CHANGE UP (ref 10.3–14.5)
SODIUM SERPL-SCNC: 134 MMOL/L — LOW (ref 135–145)
WBC # BLD: 6.15 K/UL — SIGNIFICANT CHANGE UP (ref 3.8–10.5)
WBC # FLD AUTO: 6.15 K/UL — SIGNIFICANT CHANGE UP (ref 3.8–10.5)

## 2019-08-11 PROCEDURE — 99232 SBSQ HOSP IP/OBS MODERATE 35: CPT

## 2019-08-11 RX ORDER — RISPERIDONE 4 MG/1
0.25 TABLET ORAL EVERY 12 HOURS
Refills: 0 | Status: DISCONTINUED | OUTPATIENT
Start: 2019-08-11 | End: 2019-08-11

## 2019-08-11 RX ORDER — RISPERIDONE 4 MG/1
0.25 TABLET ORAL EVERY 24 HOURS
Refills: 0 | Status: DISCONTINUED | OUTPATIENT
Start: 2019-08-11 | End: 2019-08-13

## 2019-08-11 RX ORDER — MORPHINE SULFATE 50 MG/1
1 CAPSULE, EXTENDED RELEASE ORAL EVERY 6 HOURS
Refills: 0 | Status: DISCONTINUED | OUTPATIENT
Start: 2019-08-11 | End: 2019-08-11

## 2019-08-11 RX ORDER — ENOXAPARIN SODIUM 100 MG/ML
40 INJECTION SUBCUTANEOUS EVERY 24 HOURS
Refills: 0 | Status: DISCONTINUED | OUTPATIENT
Start: 2019-08-11 | End: 2019-08-11

## 2019-08-11 RX ORDER — HALOPERIDOL DECANOATE 100 MG/ML
5 INJECTION INTRAMUSCULAR EVERY 6 HOURS
Refills: 0 | Status: DISCONTINUED | OUTPATIENT
Start: 2019-08-11 | End: 2019-08-11

## 2019-08-11 RX ORDER — MORPHINE SULFATE 50 MG/1
2 CAPSULE, EXTENDED RELEASE ORAL
Refills: 0 | Status: DISCONTINUED | OUTPATIENT
Start: 2019-08-11 | End: 2019-08-11

## 2019-08-11 RX ORDER — HALOPERIDOL DECANOATE 100 MG/ML
1 INJECTION INTRAMUSCULAR EVERY 8 HOURS
Refills: 0 | Status: DISCONTINUED | OUTPATIENT
Start: 2019-08-11 | End: 2019-08-12

## 2019-08-11 RX ORDER — MORPHINE SULFATE 50 MG/1
2 CAPSULE, EXTENDED RELEASE ORAL EVERY 4 HOURS
Refills: 0 | Status: DISCONTINUED | OUTPATIENT
Start: 2019-08-11 | End: 2019-08-11

## 2019-08-11 RX ORDER — MORPHINE SULFATE 50 MG/1
2 CAPSULE, EXTENDED RELEASE ORAL EVERY 4 HOURS
Refills: 0 | Status: DISCONTINUED | OUTPATIENT
Start: 2019-08-11 | End: 2019-08-13

## 2019-08-11 RX ORDER — MORPHINE SULFATE 50 MG/1
1 CAPSULE, EXTENDED RELEASE ORAL EVERY 6 HOURS
Refills: 0 | Status: DISCONTINUED | OUTPATIENT
Start: 2019-08-11 | End: 2019-08-12

## 2019-08-11 RX ADMIN — Medication 2.5 MILLIGRAM(S): at 11:40

## 2019-08-11 RX ADMIN — LIDOCAINE 1 PATCH: 4 CREAM TOPICAL at 05:00

## 2019-08-11 RX ADMIN — Medication 3 MILLILITER(S): at 10:00

## 2019-08-11 RX ADMIN — MORPHINE SULFATE 2 MILLIGRAM(S): 50 CAPSULE, EXTENDED RELEASE ORAL at 03:39

## 2019-08-11 RX ADMIN — Medication 2.5 MILLIGRAM(S): at 17:30

## 2019-08-11 RX ADMIN — MORPHINE SULFATE 1 MILLIGRAM(S): 50 CAPSULE, EXTENDED RELEASE ORAL at 15:19

## 2019-08-11 RX ADMIN — Medication 3 MILLILITER(S): at 04:00

## 2019-08-11 RX ADMIN — MORPHINE SULFATE 2 MILLIGRAM(S): 50 CAPSULE, EXTENDED RELEASE ORAL at 00:03

## 2019-08-11 RX ADMIN — Medication 2.5 MILLIGRAM(S): at 06:04

## 2019-08-11 RX ADMIN — MORPHINE SULFATE 2 MILLIGRAM(S): 50 CAPSULE, EXTENDED RELEASE ORAL at 10:00

## 2019-08-11 RX ADMIN — MORPHINE SULFATE 2 MILLIGRAM(S): 50 CAPSULE, EXTENDED RELEASE ORAL at 17:39

## 2019-08-11 RX ADMIN — MORPHINE SULFATE 2 MILLIGRAM(S): 50 CAPSULE, EXTENDED RELEASE ORAL at 22:20

## 2019-08-11 RX ADMIN — Medication 3 MILLILITER(S): at 21:49

## 2019-08-11 RX ADMIN — MORPHINE SULFATE 2 MILLIGRAM(S): 50 CAPSULE, EXTENDED RELEASE ORAL at 17:55

## 2019-08-11 RX ADMIN — LIDOCAINE 1 PATCH: 4 CREAM TOPICAL at 02:00

## 2019-08-11 RX ADMIN — LIDOCAINE 1 PATCH: 4 CREAM TOPICAL at 17:11

## 2019-08-11 RX ADMIN — MORPHINE SULFATE 2 MILLIGRAM(S): 50 CAPSULE, EXTENDED RELEASE ORAL at 13:11

## 2019-08-11 RX ADMIN — MORPHINE SULFATE 2 MILLIGRAM(S): 50 CAPSULE, EXTENDED RELEASE ORAL at 06:50

## 2019-08-11 RX ADMIN — MORPHINE SULFATE 2 MILLIGRAM(S): 50 CAPSULE, EXTENDED RELEASE ORAL at 21:50

## 2019-08-11 RX ADMIN — MORPHINE SULFATE 1 MILLIGRAM(S): 50 CAPSULE, EXTENDED RELEASE ORAL at 15:03

## 2019-08-11 RX ADMIN — Medication 3 MILLILITER(S): at 16:44

## 2019-08-11 RX ADMIN — LIDOCAINE 1 PATCH: 4 CREAM TOPICAL at 09:14

## 2019-08-11 RX ADMIN — MORPHINE SULFATE 2 MILLIGRAM(S): 50 CAPSULE, EXTENDED RELEASE ORAL at 03:24

## 2019-08-11 RX ADMIN — LAMOTRIGINE 100 MILLIGRAM(S): 25 TABLET, ORALLY DISINTEGRATING ORAL at 09:46

## 2019-08-11 RX ADMIN — MORPHINE SULFATE 2 MILLIGRAM(S): 50 CAPSULE, EXTENDED RELEASE ORAL at 06:35

## 2019-08-11 RX ADMIN — Medication 2.5 MILLIGRAM(S): at 00:27

## 2019-08-11 RX ADMIN — RISPERIDONE 0.25 MILLIGRAM(S): 4 TABLET ORAL at 21:49

## 2019-08-11 RX ADMIN — MORPHINE SULFATE 2 MILLIGRAM(S): 50 CAPSULE, EXTENDED RELEASE ORAL at 09:45

## 2019-08-11 RX ADMIN — LIDOCAINE 1 PATCH: 4 CREAM TOPICAL at 16:44

## 2019-08-11 RX ADMIN — MORPHINE SULFATE 2 MILLIGRAM(S): 50 CAPSULE, EXTENDED RELEASE ORAL at 12:56

## 2019-08-11 RX ADMIN — MORPHINE SULFATE 2 MILLIGRAM(S): 50 CAPSULE, EXTENDED RELEASE ORAL at 00:18

## 2019-08-11 NOTE — PROGRESS NOTE ADULT - PROBLEM SELECTOR PLAN 3
history of COPD, with worsening cough and hypoxia  -BIPAP  -duonebs history of COPD, with worsening cough and hypoxia  -HERACLIO WASHBURN   -isabelbs

## 2019-08-11 NOTE — PROGRESS NOTE ADULT - PROBLEM SELECTOR PLAN 5
history of HTN, elevated BP on admission 170-198/  -c/w enalaprilat   -currently holding PO medications in setting of BIPAP history of HTN, elevated BP on admission 170-198/  -po HTN meds held in setting of aspiration risk   -c/w enalaprilat 2.5mg q6hrs  -Can also give hydralazine 5mg or 10mg IV if SBP continues to be x>180

## 2019-08-11 NOTE — PROGRESS NOTE ADULT - RS GEN PE MLT RESP DETAILS PC
rhonchi
breath sounds equal/respirations non-labored/good air movement/clear to auscultation bilaterally/no intercostal retractions

## 2019-08-11 NOTE — PROGRESS NOTE ADULT - PROBLEM SELECTOR PLAN 2
Chest XR showing multifocal consolidation throughout R lung with small R pleural effusion. CT showing Multifocal pneumonia, multiple right-sided rib fractures and small right pleural effusion   -c/w levofloxacin   -BloodCx NGTD

## 2019-08-11 NOTE — PROGRESS NOTE ADULT - SUBJECTIVE AND OBJECTIVE BOX
HPI: Source of information are daughters.   89 year old M with COPD, HTN, HLD, CAD (s/p 2 stents), dCHF, TIA (2015), PPM, BPH, DM2, and history of MI (2012) who presenting with shortness of breath, worsening cough, and clavicular fracture s/p mechanical fall 5 days ago. Patient fell at home from chair 5 days ago but did not go to the hospital for the incident, daughters report that he had full ROM of both shoulders. There was no LOC, daughters were not present at the time of the fall. One day ago he began to have worsening chronic cough with some sputum production. Patient complains of R shoulder pain and feeling cold. No fever, chills, chest pain, abdominal pain, or urinary symptoms. Patient has an extensive medication list. Patient lives at home with wife with a home health aid that visits daily. He uses a wheelchair to move around.   Daughters aware and in agreement with respecting patient's wishes of being DNR/DNI. Last visit was in 2016 for AMS    In the ED patient presented with T 98.8, HR 67-87, RR 18-22, SpO2 85 RA - 100 hi-flow. WBC 6.71, hgb 8.6, Na 131, BUN 49, Alk-P 129, BNP 7977. CT head negative, Chest XR significant for multifocal consolidation throughout R lung, small R pleural effusion. In the ED he was given enalaprilat 1.25mg IV, levofloxacin 750mg IV, 500ml NS, and duoneb. (09 Aug 2019 15:15)    Over the past 24 hours he has been weaned off BIPAP and is now on hifow nasal canula.  Though he recently received morphine he is alert and communicative and appropriate though lethargic.  He asks "when will this be over?"    PAST MEDICAL & SURGICAL HISTORY:  TIA (transient ischemic attack)  Diastolic CHF  BPH (benign prostatic hyperplasia)  DMII (diabetes mellitus, type 2)  ST elevation myocardial infarction involving left circumflex coronary artery  Chronic obstructive pulmonary disease: COPD (chronic obstructive pulmonary disease)  Essential hypertension: HTN (hypertension)  Type 2 diabetes mellitus: Diabetes  History of arthritis: H/O: osteoarthritis  Presence of cardiac pacemaker: Cardiac pacemaker  Atherosclerosis of coronary artery: CAD (coronary artery disease)  Personal history of other endocrine, metabolic, and immunity disorders: H/O hyperlipidemia  Depressive disorder: Depression  Anxiety state: Anxiety  H/O spinal fusion  Diverticulitis  Other postprocedural status: perforated appendix, subsequent adhesion removal  Atherosclerosis of coronary artery: s/p stent placement  Presence of cardiac pacemaker: S/P cardiac pacemaker procedure    MEDICATIONS  (STANDING):  ALBUTerol/ipratropium for Nebulization 3 milliLiter(s) Nebulizer every 6 hours  enalaprilat Injectable 2.5 milliGRAM(s) IV Push every 6 hours  lamoTRIgine 100 milliGRAM(s) Oral daily  levoFLOXacin IVPB 750 milliGRAM(s) IV Intermittent every 24 hours  lidocaine   Patch 1 Patch Transdermal daily  lidocaine   Patch 1 Patch Transdermal every 24 hours  risperiDONE   Tablet 0.25 milliGRAM(s) Oral every 24 hours    MEDICATIONS  (PRN):  haloperidol    Injectable 1 milliGRAM(s) IntraMuscular every 8 hours PRN Agitation  morphine  - Injectable 2 milliGRAM(s) IV Push every 4 hours PRN Severe Pain (7 - 10)  morphine  - Injectable 1 milliGRAM(s) IV Push every 6 hours PRN Moderate Pain (4 - 6)    Vital Signs Last 24 Hrs  T(C): 37.2 (11 Aug 2019 17:18), Max: 37.2 (11 Aug 2019 17:18)  T(F): 98.9 (11 Aug 2019 17:18), Max: 98.9 (11 Aug 2019 17:18)  HR: 70 (11 Aug 2019 17:18) (66 - 82)  BP: 209/84 (11 Aug 2019 17:18) (176/77 - 216/88)  BP(mean): --  RR: 19 (11 Aug 2019 17:18) (16 - 19)  SpO2: 97% (11 Aug 2019 17:18) (96% - 98%)    Lethargic but arousable  No JVD  Chest diffuse insp rhonchi no rales  CV RRR s1s2 no murmur  Abd soft distended non-tender  Ext no edema                          9.8    6.15  )-----------( 223      ( 11 Aug 2019 06:05 )             31.1   08-11    134<L>  |  99  |  37<H>  ----------------------------<  101<H>  4.4   |  23  |  1.09    Ca    10.1      11 Aug 2019 06:05  Mg     2.3     08-11      EXAM:  CT ANGIO CHEST PE PROTOCOL IC                          EXAM:  CT ABDOMEN AND PELVIS IC                          PROCEDURE DATE:  08/09/2019          INTERPRETATION:  Clinical information: Pneumonia, dyspnea, abdominal   pain, rule out PE    Comparison: None    PROCEDURE:   CT angiographic of the Chest followed by CT abdomen and pelvis was   performed with intravenous contrast. 3-D MIPS were submitted.  90ml of Omnipaque 350 was injected intravenously. 10cc was discarded.    FINDINGS:    CHEST:    CHEST WALL AND LOWER NECK: Within normal limits  MEDIASTINUM AND DEVEN: Within normal limits  HEART: Within normal limits  VESSELS: No central pulmonary embolism. Subsegmental branches is limited   by motion.  LUNG AND LARGE AIRWAYS: Bibasilar endobronchial mucous. Bilateral   perihilar right greater than left airspace opacities. Multiple nodular   opacities right upper lobe. Small right pleural effusion. No pneumothorax.    ABDOMEN:  LIVER: Within normal limits  BILE DUCTS: Normal caliber  GALLBLADDER: No calcified gallstones. Normal caliber wall  PANCREAS: within normal limits  SPLEEN: within normal limits  ADRENALS: within normal limits  KIDNEYS: Multiple cysts    PELVIS:  REPRODUCTIVE ORGANS: Enlarged prostate  BLADDER: within normal limits    BOWEL: Large fecal load in the colon. Sigmoid diverticulosis.  PERITONEUM: No ascites or free air, no fluid collection    VESSELS: Diffuse arterial calcifications. No occlusive mesenteric   thrombus.  RETROPERITONEUM: No enlarged retroperitoneal or pelvic nodes  ABDOMINAL WALL: Within normal limits  BONES: Multiple acute fractures of the right posterior third through   ninth and right anterior fourth through eighth ribs. Multiple old   left-sided rib fractures.    IMPRESSION:     No central pulmonary embolism.    Multifocal pneumonia.    Multiple right-sided rib fractures as described, correlate for clinical   chest. Small right pleural effusion. No pneumothorax.

## 2019-08-11 NOTE — PROGRESS NOTE ADULT - PROBLEM SELECTOR PLAN 1
hypoxic respiratory failure from multifocal pneumonia. . Patient is DNR/DNI. MEWS exempt  -c/w HIFLO NC   -c/w levaquin 750mg qd IV  -follow up Palliative recs hypoxic respiratory failure from multifocal pneumonia. . Patient is DNR/DNI. MEWS exempt  -c/w HIFLO NC   -c/w levaquin 750mg qd IV  -morphine 2mg IVP q3hrs PRN for pain   -follow up Palliative recs

## 2019-08-11 NOTE — PROGRESS NOTE ADULT - SUBJECTIVE AND OBJECTIVE BOX
INTERVAL HPI/OVERNIGHT EVENTS:  Patient was seen and examined at bedside. Pt reports being comfortable on HLFNC. No chest pain, dyspnea, fevers, chills.     VITAL SIGNS:  T(F): 97.9 (08-11-19 @ 11:36)  HR: 81 (08-11-19 @ 11:36)  BP: 190/81 (08-11-19 @ 11:36)  RR: 18 (08-11-19 @ 11:36)  SpO2: 98% (08-11-19 @ 11:36)  Wt(kg): --    PHYSICAL EXAM:  Constitutional: Elderly male on HLNC. Mild distress  HEENT: PERRL, EOMI, sclera non-icteric, neck supple, trachea midline  Chest: Left PPM   Respiratory: + bilateral rhonchi bilaterally,  without accessory muscle use and no intercostal retractions  Cardiovascular: RRR, normal S1S2, no M/R/G  Gastrointestinal: soft, NTND, no masses palpable, BS normal, + reducible ventral hernia   Extremities: Warm, well perfused, pulses equal bilateral upper and lower extremities, no edema, no clubbing  Neurological: AAOx3  Skin: Normal temperature, warm, dry    MEDICATIONS  (STANDING):  ALBUTerol/ipratropium for Nebulization 3 milliLiter(s) Nebulizer every 6 hours  enalaprilat Injectable 2.5 milliGRAM(s) IV Push every 6 hours  lamoTRIgine 100 milliGRAM(s) Oral daily  levoFLOXacin IVPB 750 milliGRAM(s) IV Intermittent every 24 hours  lidocaine   Patch 1 Patch Transdermal daily  lidocaine   Patch 1 Patch Transdermal every 24 hours    MEDICATIONS  (PRN):  morphine  - Injectable 2 milliGRAM(s) IV Push every 3 hours PRN Severe Pain (7 - 10)      Allergies    Augmentin (Rash)  Keflex (Unknown)    Intolerances        LABS:                        9.8    6.15  )-----------( 223      ( 11 Aug 2019 06:05 )             31.1     08-11    134<L>  |  99  |  37<H>  ----------------------------<  101<H>  4.4   |  23  |  1.09    Ca    10.1      11 Aug 2019 06:05  Mg     2.3     08-11    TPro  6.7  /  Alb  3.8  /  TBili  0.3  /  DBili  x   /  AST  33  /  ALT  27  /  AlkPhos  129<H>  08-09    PT/INR - ( 09 Aug 2019 11:52 )   PT: 13.3 sec;   INR: 1.17          PTT - ( 09 Aug 2019 11:52 )  PTT:29.6 sec    CAPILLARY BLOOD GLUCOSE        ABG - ( 09 Aug 2019 14:18 )  pH, Arterial: 7.37  pH, Blood: x     /  pCO2: 53    /  pO2: 26    / HCO3: 30    / Base Excess: 4.1   /  SaO2: 36

## 2019-08-11 NOTE — PROGRESS NOTE ADULT - PROBLEM SELECTOR PLAN 4
Chest XR on admission showing R clavicular fracture. Patient fell from chair 5 days ago at home without LOC, did not seek medical attention as patient had full ROM at the time. CT showing R sided acute rib fractures  -pain control  -conservative management  -will hold on surgical consult given family preference for comfort Chest XR on admission showing R clavicular fracture. Patient fell from chair 5 days ago at home without LOC, did not seek medical attention as patient had full ROM at the time. CT showing R sided acute rib fractures  -pain control  -morphine PRN   -conservative management  -will hold on surgical consult given family preference for comfort

## 2019-08-11 NOTE — SWALLOW BEDSIDE ASSESSMENT ADULT - ORAL PHASE
Prolonged and reduced bolus formation/manipulation/Decreased anterior-posterior movement of the bolus

## 2019-08-11 NOTE — SWALLOW BEDSIDE ASSESSMENT ADULT - SWALLOW EVAL: DIAGNOSIS
Mild oral deficits characterized by prolonged and reduced bolus manipulation, reduced A-P transport. Given clinical presentation, respiratory insufficiency, generalized weakness, and lethargy, aspiration precautions in place. Recs for puree diet w/ thin liquids as tolerated. This SVC will f/u to assess tolerance.

## 2019-08-11 NOTE — SWALLOW BEDSIDE ASSESSMENT ADULT - SLP GENERAL OBSERVATIONS
Pt received asleep in bed, roused to verbal and tactile cues. Pt required frequent cues to sustain alertness throughout eval. Pt followed 1-step directives inconsistently, inconsistently responded to verbal prompts.

## 2019-08-11 NOTE — PROGRESS NOTE ADULT - PROBLEM SELECTOR PLAN 2
continue levaquin and monitoring  caution for aspiration  continue current pain control, may give tylenol prn continue levaquin and monitoring  caution for aspiration; dysphagia screen, may d/c NPO order  may increase morphine to 2mg q2 hrs (was q3hrs) prn per Dr. Dodson

## 2019-08-11 NOTE — PROGRESS NOTE ADULT - PROBLEM SELECTOR PLAN 9
F: no IVF  E: replete as needed  N: NPO needs speech eval   DVT: lovenox F: no IVF  E: replete as needed  N: NPO needs speech eval   DVT: lovenox  Dispo: RMF  Code: DNR DNI, MEWS exempt

## 2019-08-11 NOTE — SWALLOW BEDSIDE ASSESSMENT ADULT - ASR SWALLOW ASPIRATION MONITOR
oral hygiene/position upright (90Y)/gurgly voice/throat clearing/upper respiratory infection/change of breathing pattern/cough/fever/pneumonia

## 2019-08-11 NOTE — CHART NOTE - NSCHARTNOTEFT_GEN_A_CORE
Informed family members of changing morphine 2mg q3hrs to morphine 2mg q4hrs. Also I disagreed with family for a request for haldol 5mg IV, due to being too large of a dose. The plan was changed to give haldol 1mg IV PRN and risperidone 0.25mg to be given at 10:00pm to decrease agitation; however, family refused new EKG for Qtc prolongation surveillance acknowledging the risk of an arrythmia and death. Additionally, added 1mg morphine IV q6hrs for additional coverage for pain. Reiterated to family that palliative team will be coming in tomorrow for further management.

## 2019-08-11 NOTE — SWALLOW BEDSIDE ASSESSMENT ADULT - COMMENTS
PMHx includes COPD, CAD (s/p 2 stents), TIA (2015), MI (2012).  Per daughters at bedside, pt did not demonstrate difficulty swallowing prior to admission. Pt consumed mostly liquid diet (e.g. milkshakes, smoothies, etc.) 2/2 preference.  Pt on HFNC.

## 2019-08-11 NOTE — PROGRESS NOTE ADULT - SUBJECTIVE AND OBJECTIVE BOX
Interval Events: Reviewed  Patient seen and examined at bedside.    89 year old Male with PMH HTN, HLD, DM, TIA, CHF, PPM, CAD, COPD, MI, depression/anxiety, who presented with a chief complaint of SOB, diagnosed with Multifocal pneumonia on levaquin and oxygen. pt states he feels better than before. pts family concerned pt is not on enough pain medication as he is having some breakthrough pain, would like frequency of morphine increased. pt not in pain now, recently had morphine per family.      PAST MEDICAL & SURGICAL HISTORY:  TIA (transient ischemic attack)  Diastolic CHF  BPH (benign prostatic hyperplasia)  DMII (diabetes mellitus, type 2)  ST elevation myocardial infarction involving left circumflex coronary artery  Chronic obstructive pulmonary disease: COPD (chronic obstructive pulmonary disease)  Essential hypertension: HTN (hypertension)  Type 2 diabetes mellitus: Diabetes  History of arthritis: H/O: osteoarthritis  Presence of cardiac pacemaker: Cardiac pacemaker  Atherosclerosis of coronary artery: CAD (coronary artery disease)  Personal history of other endocrine, metabolic, and immunity disorders: H/O hyperlipidemia  Depressive disorder: Depression  Anxiety state: Anxiety  H/O spinal fusion  Diverticulitis  Other postprocedural status: perforated appendix, subsequent adhesion removal  Atherosclerosis of coronary artery: s/p stent placement  Presence of cardiac pacemaker: S/P cardiac pacemaker procedure      MEDICATIONS:  Pulmonary:  ALBUTerol/ipratropium for Nebulization 3 milliLiter(s) Nebulizer every 6 hours    Antimicrobials:  levoFLOXacin IVPB 750 milliGRAM(s) IV Intermittent every 24 hours    Anticoagulants:  enoxaparin Injectable 40 milliGRAM(s) SubCutaneous every 24 hours    Cardiac:  enalaprilat Injectable 2.5 milliGRAM(s) IV Push every 6 hours      Allergies    Augmentin (Rash)  Keflex (Unknown)    Intolerances        Vital Signs Last 24 Hrs  T(C): 36.6 (11 Aug 2019 11:36), Max: 37.4 (10 Aug 2019 21:10)  T(F): 97.9 (11 Aug 2019 11:36), Max: 99.3 (10 Aug 2019 21:10)  HR: 81 (11 Aug 2019 11:36) (66 - 86)  BP: 190/81 (11 Aug 2019 11:36) (176/77 - 220/88)  BP(mean): --  RR: 18 (11 Aug 2019 11:36) (16 - 18)  SpO2: 98% (11 Aug 2019 11:36) (96% - 99%)    08-10 @ 07:01  -  08-11 @ 07:00  --------------------------------------------------------  IN: 150 mL / OUT: 0 mL / NET: 150 mL          LABS:  ABG - ( 09 Aug 2019 14:18 )  pH, Arterial: 7.37  pH, Blood: x     /  pCO2: 53    /  pO2: 26    / HCO3: 30    / Base Excess: 4.1   /  SaO2: 36                  CBC Full  -  ( 11 Aug 2019 06:05 )  WBC Count : 6.15 K/uL  RBC Count : 3.54 M/uL  Hemoglobin : 9.8 g/dL  Hematocrit : 31.1 %  Platelet Count - Automated : 223 K/uL  Mean Cell Volume : 87.9 fl  Mean Cell Hemoglobin : 27.7 pg  Mean Cell Hemoglobin Concentration : 31.5 gm/dL  Auto Neutrophil # : x  Auto Lymphocyte # : x  Auto Monocyte # : x  Auto Eosinophil # : x  Auto Basophil # : x  Auto Neutrophil % : x  Auto Lymphocyte % : x  Auto Monocyte % : x  Auto Eosinophil % : x  Auto Basophil % : x    08-11    134<L>  |  99  |  37<H>  ----------------------------<  101<H>  4.4   |  23  |  1.09    Ca    10.1      11 Aug 2019 06:05  Mg     2.3     08-11                          RADIOLOGY & ADDITIONAL STUDIES (The following images were personally reviewed):  Henriquez:                                     No  Urine output:                       adequate  DVT prophylaxis:                 Yes  Flattus:                                  Yes  Bowel movement:              No

## 2019-08-12 DIAGNOSIS — Z71.89 OTHER SPECIFIED COUNSELING: ICD-10-CM

## 2019-08-12 DIAGNOSIS — R05 COUGH: ICD-10-CM

## 2019-08-12 DIAGNOSIS — Z51.5 ENCOUNTER FOR PALLIATIVE CARE: ICD-10-CM

## 2019-08-12 DIAGNOSIS — R06.02 SHORTNESS OF BREATH: ICD-10-CM

## 2019-08-12 DIAGNOSIS — R52 PAIN, UNSPECIFIED: ICD-10-CM

## 2019-08-12 LAB
ANION GAP SERPL CALC-SCNC: 13 MMOL/L — SIGNIFICANT CHANGE UP (ref 5–17)
BUN SERPL-MCNC: 35 MG/DL — HIGH (ref 7–23)
CALCIUM SERPL-MCNC: 9.9 MG/DL — SIGNIFICANT CHANGE UP (ref 8.4–10.5)
CHLORIDE SERPL-SCNC: 98 MMOL/L — SIGNIFICANT CHANGE UP (ref 96–108)
CO2 SERPL-SCNC: 22 MMOL/L — SIGNIFICANT CHANGE UP (ref 22–31)
CREAT SERPL-MCNC: 0.95 MG/DL — SIGNIFICANT CHANGE UP (ref 0.5–1.3)
GLUCOSE SERPL-MCNC: 99 MG/DL — SIGNIFICANT CHANGE UP (ref 70–99)
HCT VFR BLD CALC: 33.1 % — LOW (ref 39–50)
HGB BLD-MCNC: 10.3 G/DL — LOW (ref 13–17)
MAGNESIUM SERPL-MCNC: 2.1 MG/DL — SIGNIFICANT CHANGE UP (ref 1.6–2.6)
MCHC RBC-ENTMCNC: 27.5 PG — SIGNIFICANT CHANGE UP (ref 27–34)
MCHC RBC-ENTMCNC: 31.1 GM/DL — LOW (ref 32–36)
MCV RBC AUTO: 88.3 FL — SIGNIFICANT CHANGE UP (ref 80–100)
NRBC # BLD: 0 /100 WBCS — SIGNIFICANT CHANGE UP (ref 0–0)
PLATELET # BLD AUTO: 235 K/UL — SIGNIFICANT CHANGE UP (ref 150–400)
POTASSIUM SERPL-MCNC: 4.2 MMOL/L — SIGNIFICANT CHANGE UP (ref 3.5–5.3)
POTASSIUM SERPL-SCNC: 4.2 MMOL/L — SIGNIFICANT CHANGE UP (ref 3.5–5.3)
RBC # BLD: 3.75 M/UL — LOW (ref 4.2–5.8)
RBC # FLD: 13.4 % — SIGNIFICANT CHANGE UP (ref 10.3–14.5)
SODIUM SERPL-SCNC: 133 MMOL/L — LOW (ref 135–145)
WBC # BLD: 7.04 K/UL — SIGNIFICANT CHANGE UP (ref 3.8–10.5)
WBC # FLD AUTO: 7.04 K/UL — SIGNIFICANT CHANGE UP (ref 3.8–10.5)

## 2019-08-12 PROCEDURE — 99497 ADVNCD CARE PLAN 30 MIN: CPT | Mod: 25

## 2019-08-12 PROCEDURE — 99223 1ST HOSP IP/OBS HIGH 75: CPT

## 2019-08-12 PROCEDURE — 99498 ADVNCD CARE PLAN ADDL 30 MIN: CPT | Mod: 25

## 2019-08-12 RX ORDER — DOCUSATE SODIUM 100 MG
100 CAPSULE ORAL DAILY
Refills: 0 | Status: DISCONTINUED | OUTPATIENT
Start: 2019-08-12 | End: 2019-08-12

## 2019-08-12 RX ORDER — MORPHINE SULFATE 50 MG/1
2 CAPSULE, EXTENDED RELEASE ORAL EVERY 4 HOURS
Refills: 0 | Status: DISCONTINUED | OUTPATIENT
Start: 2019-08-12 | End: 2019-08-13

## 2019-08-12 RX ORDER — MORPHINE SULFATE 50 MG/1
1 CAPSULE, EXTENDED RELEASE ORAL
Refills: 0 | Status: DISCONTINUED | OUTPATIENT
Start: 2019-08-12 | End: 2019-08-13

## 2019-08-12 RX ADMIN — MORPHINE SULFATE 2 MILLIGRAM(S): 50 CAPSULE, EXTENDED RELEASE ORAL at 14:40

## 2019-08-12 RX ADMIN — Medication 3 MILLILITER(S): at 09:41

## 2019-08-12 RX ADMIN — Medication 3 MILLILITER(S): at 04:25

## 2019-08-12 RX ADMIN — Medication 3 MILLILITER(S): at 21:53

## 2019-08-12 RX ADMIN — RISPERIDONE 0.25 MILLIGRAM(S): 4 TABLET ORAL at 21:53

## 2019-08-12 RX ADMIN — LIDOCAINE 1 PATCH: 4 CREAM TOPICAL at 22:44

## 2019-08-12 RX ADMIN — MORPHINE SULFATE 2 MILLIGRAM(S): 50 CAPSULE, EXTENDED RELEASE ORAL at 21:54

## 2019-08-12 RX ADMIN — LIDOCAINE 1 PATCH: 4 CREAM TOPICAL at 19:07

## 2019-08-12 RX ADMIN — MORPHINE SULFATE 1 MILLIGRAM(S): 50 CAPSULE, EXTENDED RELEASE ORAL at 12:48

## 2019-08-12 RX ADMIN — LAMOTRIGINE 100 MILLIGRAM(S): 25 TABLET, ORALLY DISINTEGRATING ORAL at 12:11

## 2019-08-12 RX ADMIN — MORPHINE SULFATE 1 MILLIGRAM(S): 50 CAPSULE, EXTENDED RELEASE ORAL at 00:50

## 2019-08-12 RX ADMIN — Medication 3 MILLILITER(S): at 16:46

## 2019-08-12 RX ADMIN — LIDOCAINE 1 PATCH: 4 CREAM TOPICAL at 02:03

## 2019-08-12 RX ADMIN — MORPHINE SULFATE 1 MILLIGRAM(S): 50 CAPSULE, EXTENDED RELEASE ORAL at 13:05

## 2019-08-12 RX ADMIN — MORPHINE SULFATE 1 MILLIGRAM(S): 50 CAPSULE, EXTENDED RELEASE ORAL at 17:00

## 2019-08-12 RX ADMIN — LIDOCAINE 1 PATCH: 4 CREAM TOPICAL at 04:40

## 2019-08-12 RX ADMIN — Medication 100 MILLIGRAM(S): at 12:11

## 2019-08-12 RX ADMIN — Medication 2.5 MILLIGRAM(S): at 00:19

## 2019-08-12 RX ADMIN — Medication 3.38 MILLIGRAM(S): at 19:13

## 2019-08-12 RX ADMIN — MORPHINE SULFATE 1 MILLIGRAM(S): 50 CAPSULE, EXTENDED RELEASE ORAL at 00:20

## 2019-08-12 RX ADMIN — MORPHINE SULFATE 2 MILLIGRAM(S): 50 CAPSULE, EXTENDED RELEASE ORAL at 14:24

## 2019-08-12 RX ADMIN — MORPHINE SULFATE 2 MILLIGRAM(S): 50 CAPSULE, EXTENDED RELEASE ORAL at 04:53

## 2019-08-12 RX ADMIN — Medication 2.5 MILLIGRAM(S): at 07:07

## 2019-08-12 RX ADMIN — MORPHINE SULFATE 1 MILLIGRAM(S): 50 CAPSULE, EXTENDED RELEASE ORAL at 16:45

## 2019-08-12 RX ADMIN — MORPHINE SULFATE 2 MILLIGRAM(S): 50 CAPSULE, EXTENDED RELEASE ORAL at 10:15

## 2019-08-12 RX ADMIN — MORPHINE SULFATE 2 MILLIGRAM(S): 50 CAPSULE, EXTENDED RELEASE ORAL at 04:38

## 2019-08-12 RX ADMIN — MORPHINE SULFATE 2 MILLIGRAM(S): 50 CAPSULE, EXTENDED RELEASE ORAL at 10:30

## 2019-08-12 RX ADMIN — Medication 1 PATCH: at 19:12

## 2019-08-12 RX ADMIN — Medication 2.5 MILLIGRAM(S): at 12:11

## 2019-08-12 RX ADMIN — LIDOCAINE 1 PATCH: 4 CREAM TOPICAL at 12:12

## 2019-08-12 NOTE — CONSULT NOTE ADULT - PROBLEM SELECTOR RECOMMENDATION 9
chest XRay 8/9 revealed right sided multifocal consolidation   Family wishes to discontinue ABT at this time

## 2019-08-12 NOTE — CONSULT NOTE ADULT - PROBLEM SELECTOR RECOMMENDATION 2
worsening cough over the weekend  May consider tesillon pearls  Oral suctioning only  May consider Scopolamine patch to dry up some of the secretions  continue med nebs

## 2019-08-12 NOTE — CONSULT NOTE ADULT - PROBLEM SELECTOR RECOMMENDATION 3
pt c/o overall genralized pain  with recent fall, scapula fx, severe stenosis Family asking for ATC coverage of analgesia  Consider Morphine 2 mg IV q 4 hr ATC with Morphine 1 mg IV q 2 hr prn pain   add Colace liquid  Dulocolax supp prn

## 2019-08-12 NOTE — PROGRESS NOTE ADULT - PROBLEM SELECTOR PLAN 1
hypoxic respiratory failure from multifocal pneumonia. . Patient is DNR/DNI. MEWS exempt  -Weaned to 5L NC  -Dc levaquin 750mg qd IV per pt's family   -Palliative care consult, appreciate recs  -morphine 2mg IVP q3hrs PRN for pain   -follow up Palliative recs hypoxic respiratory failure from multifocal pneumonia. . Patient is DNR/DNI. MEWS exempt  -Weaned to 5L NC  -Dc levaquin 750mg qd IV per pt's family   -Palliative care consult, appreciate recs

## 2019-08-12 NOTE — CONSULT NOTE ADULT - PROBLEM SELECTOR RECOMMENDATION 5
Support provided to patient and family. Patient to have access to supportive services during rest of hospital stay as the patient/family deemed necessary ie. Chaplaincy, Massage therapy, Music therapy, Patient and family supportive services, Palliative SW, etc.    As discussed during the palliative IDT meeting and as identified during the patients PSSA screening the patient would benefit from: SW, Pet therapy increasing SOB over weekend  O2 N/C in situ  morphine as above for pain

## 2019-08-12 NOTE — CONSULT NOTE ADULT - SUBJECTIVE AND OBJECTIVE BOX
TONIE GEORGE   MRN-[id number]    : 3/5/1926     HPI:  Source of information are daughters.   89 year old M with COPD, HTN, HLD, CAD (s/p 2 stents), dCHF, TIA (), PPM, BPH, DM2, and history of MI () who presenting with shortness of breath, worsening cough, and clavicular fracture s/p mechanical fall 5 days ago. Patient fell at home from chair 5 days ago but did not go to the hospital for the incident, daughters report that he had full ROM of both shoulders. There was no LOC, daughters were not present at the time of the fall. One day ago he began to have worsening chronic cough with some sputum production. Patient complains of R shoulder pain and feeling cold. No fever, chills, chest pain, abdominal pain, or urinary symptoms. Patient has an extensive medication list. Patient lives at home with wife with a home health aid that visits daily. He uses a wheelchair to move around.   Daughters aware and in agreement with respecting patient's wishes of being DNR/DNI. Last visit was in  for AMS    In the ED patient presented with T 98.8, HR 67-87, RR 18-22, SpO2 85 RA - 100 hi-flow. WBC 6.71, hgb 8.6, Na 131, BUN 49, Alk-P 129, BNP 7977. CT head negative, Chest XR significant for multifocal consolidation throughout R lung, small R pleural effusion. In the ED he was given enalaprilat 1.25mg IV, levofloxacin 750mg IV, 500ml NS, and duoneb. (09 Aug 2019 15:15)    Palliative Medicine is consulted to assist with symptom management and good EOL symptom management       PAST MEDICAL & SURGICAL HISTORY:  TIA (transient ischemic attack)  Diastolic CHF  BPH (benign prostatic hyperplasia)  DMII (diabetes mellitus, type 2)  ST elevation myocardial infarction involving left circumflex coronary artery  Chronic obstructive pulmonary disease: COPD (chronic obstructive pulmonary disease)  Essential hypertension: HTN (hypertension)  Type 2 diabetes mellitus: Diabetes  History of arthritis: H/O: osteoarthritis  Presence of cardiac pacemaker: Cardiac pacemaker  Atherosclerosis of coronary artery: CAD (coronary artery disease)  Personal history of other endocrine, metabolic, and immunity disorders: H/O hyperlipidemia  Depressive disorder: Depression  Anxiety state: Anxiety  H/O spinal fusion  Diverticulitis  Other postprocedural status: perforated appendix, subsequent adhesion removal  Atherosclerosis of coronary artery: s/p stent placement  Presence of cardiac pacemaker: S/P cardiac pacemaker procedure    FAMILY HISTORY:  No pertinent family history in first degree relatives    SOCIAL HX:  x 60 years, 4 children, 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[place], , 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, [employer]   ROS:    Unable to attain due to:                      Dyspnea (Napoleon 0-10): 5/10                       N/V (Y/N): N                             Secretions (Y/N) : Y               Agitation(Y/N): Y  Pain (Y/N): pt with generalized body pain      -Provocation/Palliation:  -Quality/Quantity:  -Radiating:  -Severity:  -Timing/Frequency:  -Impact on ADLs:    General:  Denied  HEENT:    Denied  Neck:  Denied  CVS:  + HTN  Resp:  + SOB  GI:  Denied  :  Denied  Musc:  Denied  Neuro:  Denied  Psych:  Denied  Skin:  Denied  Lymph:  Denied    Allergies    Augmentin (Rash)  Keflex (Unknown)    Intolerances    Opiate Naive (Y/N): YES  -iStop reviewed (Y/N): YES I Ref: [id number]    Medications:      MEDICATIONS  (STANDING):  ALBUTerol/ipratropium for Nebulization 3 milliLiter(s) Nebulizer every 6 hours  cloNIDine Patch 0.1 mG/24Hr(s) 1 patch Transdermal every 7 days  enalaprilat Injectable 2.5 milliGRAM(s) IV Push every 6 hours  lamoTRIgine 100 milliGRAM(s) Oral daily  lidocaine   Patch 1 Patch Transdermal daily  lidocaine   Patch 1 Patch Transdermal every 24 hours  risperiDONE   Tablet 0.25 milliGRAM(s) Oral every 24 hours    MEDICATIONS  (PRN):  morphine  - Injectable 2 milliGRAM(s) IV Push every 4 hours PRN Severe Pain (7 - 10)  morphine  - Injectable 1 milliGRAM(s) IV Push every 6 hours PRN Moderate Pain (4 - 6)      Labs:    CBC:                        10.3   7.04  )-----------( 235      ( 12 Aug 2019 05:47 )             33.1     CMP:        133<L>  |  98  |  35<H>  ----------------------------<  99  4.2   |  22  |  0.95    Ca    9.9      12 Aug 2019 05:47  Mg     2.1         Culture - Blood (19 @ 12:06)    Specimen Source: .Blood Blood-Peripheral    Culture Results:   No growth at 3 days.    Imaging:    EXAM:  CT ANGIO CHEST PE PROTOCOL IC                          EXAM:  CT ABDOMEN AND PELVIS IC                          PROCEDURE DATE:  2019        INTERPRETATION:  Clinical information: Pneumonia, dyspnea, abdominal   pain, rule out PE    Comparison: None    PROCEDURE:   CT angiographic of the Chest followed by CT abdomen and pelvis was   performed with intravenous contrast. 3-D MIPS were submitted.  90ml of Omnipaque 350 was injected intravenously. 10cc was discarded.    FINDINGS:    CHEST:    CHEST WALL AND LOWER NECK: Within normal limits  MEDIASTINUM AND DEVEN: Within normal limits  HEART: Within normal limits  VESSELS: No central pulmonary embolism. Subsegmental branches is limited   by motion.  LUNG AND LARGE AIRWAYS: Bibasilar endobronchial mucous. Bilateral   perihilar right greater than left airspace opacities. Multiple nodular   opacities right upper lobe. Small right pleural effusion. No pneumothorax.    ABDOMEN:  LIVER: Within normal limits  BILE DUCTS: Normal caliber  GALLBLADDER: No calcified gallstones. Normal caliber wall  PANCREAS: within normal limits  SPLEEN: within normal limits  ADRENALS: within normal limits  KIDNEYS: Multiple cysts    PELVIS:  REPRODUCTIVE ORGANS: Enlarged prostate  BLADDER: within normal limits    BOWEL: Large fecal load in the colon. Sigmoid diverticulosis.  PERITONEUM: No ascites or free air, no fluid collection    VESSELS: Diffuse arterial calcifications. No occlusive mesenteric   thrombus.  RETROPERITONEUM: No enlarged retroperitoneal or pelvic nodes  ABDOMINAL WALL: Within normal limits  BONES: Multiple acute fractures of the right posterior third through   ninth and right anterior fourth through eighth ribs. Multiple old   left-sided rib fractures.    IMPRESSION:     No central pulmonary embolism.    Multifocal pneumonia.    Multiple right-sided rib fractures as described, correlate for clinical   chest. Small right pleural effusion. No pneumothorax.  No acute findings abdomen pelvis. Incidental comments as above.    PEx:  T(C): 37.1 (19 @ 12:04), Max: 37.2 (19 @ 17:18)  HR: 82 (19 @ 12:04) (65 - 101)  BP: 227/88 (19 @ 12:04) (176/80 - 227/88)  RR: 20 (19 @ 12:04) (16 - 25)  SpO2: 98% (19 @ 12:04) (97% - 100%)  Wt(kg): --63    General:   HEENT:    Neck:   CVS:   Resp:   GI:    :    Musc:     Neuro:   Psych:     Skin:  Lymph:  Preadmit Karnofsky:  %           Current Karnofsky:   10-20  %  Cachexia (Y/N): Y  BMI:2.4    Advanced Directives:    DNR/DNI    MOLST       Decision maker: pt does not have capacity to make complex medical decisions   Legal surrogate:      GOALS OF CARE DISCUSSION       Palliative care info/counseling provided	           Family meeting       Advanced Directives addressed please see Advance Care Planning Note	           See previous Palliative Medicine Note       Documentation of GOC: 	          REFERRALS	        Palliative Med        Unit SW/Case Mgmt              Speech/Swallow       Patient/Family Support       Massage Therapy       Music Therapy       Hospice       Nutrition       Ethics       PT/OT TONIE GEORGE   [id number]    : 3/5/1926     HPI:  Source of information are daughters.   89 year old M with COPD, HTN, HLD, CAD (s/p 2 stents), dCHF, TIA (), PPM, BPH, DM2, and history of MI () who presenting with shortness of breath, worsening cough, and clavicular fracture s/p mechanical fall 5 days ago. Patient fell at home from chair 5 days ago but did not go to the hospital for the incident, daughters report that he had full ROM of both shoulders. There was no LOC, daughters were not present at the time of the fall. One day ago he began to have worsening chronic cough with some sputum production. Patient complains of R shoulder pain and feeling cold. No fever, chills, chest pain, abdominal pain, or urinary symptoms. Patient has an extensive medication list. Patient lives at home with wife with a home health aid that visits daily. He uses a wheelchair to move around.   Daughters aware and in agreement with respecting patient's wishes of being DNR/DNI. Last visit was in  for AMS    In the ED patient presented with T 98.8, HR 67-87, RR 18-22, SpO2 85 RA - 100 hi-flow. WBC 6.71, hgb 8.6, Na 131, BUN 49, Alk-P 129, BNP 7977. CT head negative, Chest XR significant for multifocal consolidation throughout R lung, small R pleural effusion. In the ED he was given enalaprilat 1.25mg IV, levofloxacin 750mg IV, 500ml NS, and duoneb. (09 Aug 2019 15:15)    Palliative Medicine is consulted to assist with symptom management and good EOL symptom management       PAST MEDICAL & SURGICAL HISTORY:  TIA (transient ischemic attack)  Diastolic CHF  BPH (benign prostatic hyperplasia)  DMII (diabetes mellitus, type 2)  ST elevation myocardial infarction involving left circumflex coronary artery  Chronic obstructive pulmonary disease: COPD (chronic obstructive pulmonary disease)  Essential hypertension: HTN (hypertension)  Type 2 diabetes mellitus: Diabetes  History of arthritis: H/O: osteoarthritis  Presence of cardiac pacemaker: Cardiac pacemaker  Atherosclerosis of coronary artery: CAD (coronary artery disease)  Personal history of other endocrine, metabolic, and immunity disorders: H/O hyperlipidemia  Depressive disorder: Depression  Anxiety state: Anxiety  H/O spinal fusion  Diverticulitis  Other postprocedural status: perforated appendix, subsequent adhesion removal  Atherosclerosis of coronary artery: s/p stent placement  Presence of cardiac pacemaker: S/P cardiac pacemaker procedure    FAMILY HISTORY:  No pertinent family history in first degree relatives    SOCIAL HX:  x 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, 4 children, 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[employer], , 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, [employer]   ROS:    Unable to attain due to:                      Dyspnea (Napoleon 0-10): 5/10                       N/V (Y/N): N                             Secretions (Y/N) : Y               Agitation(Y/N): Y  Pain (Y/N): pt with generalized body pain  unable to qualify or quantify just says "ow"    -Provocation/Palliation:  -Quality/Quantity:  -Radiating:  -Severity:  -Timing/Frequency:  -Impact on ADLs:    General:  Denied  HEENT:    Denied  Neck:  Denied  CVS:  + HTN  Resp:  + SOB  GI:  Denied  :  Denied  Musc:  Denied  Neuro:  Denied  Psych:  Denied  Skin:  Denied  Lymph:  Denied    Allergies    Augmentin (Rash)  Keflex (Unknown)    Intolerances    Opiate Naive (Y/N): YES  -iStop reviewed (Y/N): YES I Ref: [id number]    Medications:      MEDICATIONS  (STANDING):  ALBUTerol/ipratropium for Nebulization 3 milliLiter(s) Nebulizer every 6 hours  cloNIDine Patch 0.1 mG/24Hr(s) 1 patch Transdermal every 7 days  enalaprilat Injectable 2.5 milliGRAM(s) IV Push every 6 hours  lamoTRIgine 100 milliGRAM(s) Oral daily  lidocaine   Patch 1 Patch Transdermal daily  lidocaine   Patch 1 Patch Transdermal every 24 hours  risperiDONE   Tablet 0.25 milliGRAM(s) Oral every 24 hours    MEDICATIONS  (PRN):  morphine  - Injectable 2 milliGRAM(s) IV Push every 4 hours PRN Severe Pain (7 - 10)  morphine  - Injectable 1 milliGRAM(s) IV Push every 6 hours PRN Moderate Pain (4 - 6)      Labs:    CBC:                        10.3   7.04  )-----------( 235      ( 12 Aug 2019 05:47 )             33.1     CMP:    08-12    133<L>  |  98  |  35<H>  ----------------------------<  99  4.2   |  22  |  0.95    Ca    9.9      12 Aug 2019 05:47  Mg     2.1     -    Culture - Blood (19 @ 12:06)    Specimen Source: .Blood Blood-Peripheral    Culture Results:   No growth at 3 days.    Imaging:        EXAM:  CT ANGIO CHEST PE PROTOCOL IC                          EXAM:  CT ABDOMEN AND PELVIS IC                          PROCEDURE DATE:  2019        INTERPRETATION:  Clinical information: Pneumonia, dyspnea, abdominal   pain, rule out PE    Comparison: None    PROCEDURE:   CT angiographic of the Chest followed by CT abdomen and pelvis was   performed with intravenous contrast. 3-D MIPS were submitted.  90ml of Omnipaque 350 was injected intravenously. 10cc was discarded.    FINDINGS:    CHEST:    CHEST WALL AND LOWER NECK: Within normal limits  MEDIASTINUM AND DEVEN: Within normal limits  HEART: Within normal limits  VESSELS: No central pulmonary embolism. Subsegmental branches is limited   by motion.  LUNG AND LARGE AIRWAYS: Bibasilar endobronchial mucous. Bilateral   perihilar right greater than left airspace opacities. Multiple nodular   opacities right upper lobe. Small right pleural effusion. No pneumothorax.    ABDOMEN:  LIVER: Within normal limits  BILE DUCTS: Normal caliber  GALLBLADDER: No calcified gallstones. Normal caliber wall  PANCREAS: within normal limits  SPLEEN: within normal limits  ADRENALS: within normal limits  KIDNEYS: Multiple cysts    PELVIS:  REPRODUCTIVE ORGANS: Enlarged prostate  BLADDER: within normal limits    BOWEL: Large fecal load in the colon. Sigmoid diverticulosis.  PERITONEUM: No ascites or free air, no fluid collection    VESSELS: Diffuse arterial calcifications. No occlusive mesenteric   thrombus.  RETROPERITONEUM: No enlarged retroperitoneal or pelvic nodes  ABDOMINAL WALL: Within normal limits  BONES: Multiple acute fractures of the right posterior third through   ninth and right anterior fourth through eighth ribs. Multiple old   left-sided rib fractures.    IMPRESSION:     No central pulmonary embolism.    Multifocal pneumonia.    Multiple right-sided rib fractures as described, correlate for clinical   chest. Small right pleural effusion. No pneumothorax.  No acute findings abdomen pelvis. Incidental comments as above.    PEx:  T(C): 37.1 (19 @ 12:04), Max: 37.2 (19 @ 17:18)  HR: 82 (19 @ 12:04) (65 - 101)  BP: 227/88 (19 @ 12:04) (176/80 - 227/88)  RR: 20 (19 @ 12:04) (16 - 25)  SpO2: 98% (19 @ 12:04) (97% - 100%)  Wt(kg): --63    General: elderly weak and frail male with c/o back and shoulder pain  HEENT:  NC/AT, scelra anicteric, MM dry  Neck: supple, no JVD  CVS: S1S2 irregularly irregular, no murmur  Resp: diffuse rhonchi B/L  GI:   soft NT passing flatus, no BM x 5 days  :  decreasing urine output  Musc:  weakness   Neuro: lethargic, aroused to name but falls asleep, cannot follow commands  Psych: calm    Skin: no rashes observed  Lymph: No LAD  Preadmit Karnofsky: 30 %           Current Karnofsky:   10-20  %  Cachexia (Y/N): Y  BMI:2.4    Advanced Directives:    DNR/DNI    MOLST       Decision maker: pt does not have capacity to make complex medical decisions   HCP:  Sherry George: wife [phone]  Alternate proxy: Sheryl Hannah: dgt: [phone]  Other Local contacts: children  Chichi Hannah [phone]  Pravin Hannah [phone]  Reece George [phone]      GOALS OF CARE DISCUSSION       Palliative care info/counseling provided	           Family meeting      Advanced Directives addressed please see Goals of Care            Documentation of GOC: for HH 	          REFERRALS	        Palliative Med        Unit SW/Case Mgmt        Speech/Swallow        Hospice: [facility]

## 2019-08-12 NOTE — DIETITIAN INITIAL EVALUATION ADULT. - PROBLEM SELECTOR PLAN 4
Chest XR on admission showing R clavicular fracture. Patient fell from chair 5 days ago at home without LOC, did not seek medical attention as patient had full ROM at the time. CT showing R sided acute rib fractures  -pain control  -conservative management  -will hold on surgical consult given family preference for comfort

## 2019-08-12 NOTE — DIETITIAN INITIAL EVALUATION ADULT. - PROBLEM SELECTOR PLAN 7
history of dCHF, BNP 7977 on admission  -monitor fluid status closely  -holding PO medications in setting of BIPAP

## 2019-08-12 NOTE — PROGRESS NOTE ADULT - SUBJECTIVE AND OBJECTIVE BOX
HPI:  Source of information are daughters.   89 year old M with COPD, HTN, HLD, CAD (s/p 2 stents), dCHF, TIA (2015), PPM, BPH, DM2, and history of MI (2012) who presenting with shortness of breath, worsening cough, and clavicular fracture s/p mechanical fall 5 days ago. Patient fell at home from chair 5 days ago but did not go to the hospital for the incident, daughters report that he had full ROM of both shoulders. There was no LOC, daughters were not present at the time of the fall. One day ago he began to have worsening chronic cough with some sputum production. Patient complains of R shoulder pain and feeling cold. No fever, chills, chest pain, abdominal pain, or urinary symptoms. Patient has an extensive medication list. Patient lives at home with wife with a home health aid that visits daily. He uses a wheelchair to move around.   Daughters aware and in agreement with respecting patient's wishes of being DNR/DNI. Last visit was in 2016 for AMS    In the ED patient presented with T 98.8, HR 67-87, RR 18-22, SpO2 85 RA - 100 hi-flow. WBC 6.71, hgb 8.6, Na 131, BUN 49, Alk-P 129, BNP 7977. CT head negative, Chest XR significant for multifocal consolidation throughout R lung, small R pleural effusion. In the ED he was given enalaprilat 1.25mg IV, levofloxacin 750mg IV, 500ml NS, and duoneb. (09 Aug 2019 15:15)    PAST MEDICAL & SURGICAL HISTORY:  TIA (transient ischemic attack)  Diastolic CHF  BPH (benign prostatic hyperplasia)  DMII (diabetes mellitus, type 2)  ST elevation myocardial infarction involving left circumflex coronary artery  Chronic obstructive pulmonary disease: COPD (chronic obstructive pulmonary disease)  Essential hypertension: HTN (hypertension)  Type 2 diabetes mellitus: Diabetes  History of arthritis: H/O: osteoarthritis  Presence of cardiac pacemaker: Cardiac pacemaker  Atherosclerosis of coronary artery: CAD (coronary artery disease)  Personal history of other endocrine, metabolic, and immunity disorders: H/O hyperlipidemia  Depressive disorder: Depression  Anxiety state: Anxiety  H/O spinal fusion  Diverticulitis  Other postprocedural status: perforated appendix, subsequent adhesion removal  Atherosclerosis of coronary artery: s/p stent placement  Presence of cardiac pacemaker: S/P cardiac pacemaker procedure    MEDICATIONS  (STANDING):  ALBUTerol/ipratropium for Nebulization 3 milliLiter(s) Nebulizer every 6 hours  cloNIDine Patch 0.1 mG/24Hr(s) 1 patch Transdermal every 7 days  enalaprilat Injectable 3.375 milliGRAM(s) IV Push every 6 hours  lamoTRIgine 100 milliGRAM(s) Oral daily  lidocaine   Patch 1 Patch Transdermal every 24 hours  morphine  - Injectable 2 milliGRAM(s) IV Push every 4 hours  risperiDONE   Tablet 0.25 milliGRAM(s) Oral every 24 hours    MEDICATIONS  (PRN):  morphine  - Injectable 1 milliGRAM(s) IV Push every 2 hours PRN Breakthrough pain  morphine  - Injectable 2 milliGRAM(s) IV Push every 4 hours PRN Severe Pain (7 - 10)    Vital Signs Last 24 Hrs  T(C): 37.1 (12 Aug 2019 12:04), Max: 37.1 (12 Aug 2019 12:04)  T(F): 98.7 (12 Aug 2019 12:04), Max: 98.7 (12 Aug 2019 12:04)  HR: 78 (12 Aug 2019 16:38) (65 - 101)  BP: 227/88 (12 Aug 2019 12:04) (176/80 - 227/88)  BP(mean): --  RR: 20 (12 Aug 2019 16:38) (16 - 25)  SpO2: 98% (12 Aug 2019 16:38) (97% - 100%)    Lethargic but arousablke with periods of being lucid  No JVD  Chest Rhonchi no rales  CV RRR s1s2 no murmur  Abd soft  Ext no edema                          10.3   7.04  )-----------( 235      ( 12 Aug 2019 05:47 )             33.1   08-12    133<L>  |  98  |  35<H>  ----------------------------<  99  4.2   |  22  |  0.95    Ca    9.9      12 Aug 2019 05:47  Mg     2.1     08-12

## 2019-08-12 NOTE — GOALS OF CARE CONVERSATION - PERSONAL ADVANCE DIRECTIVE - TREATMENT GUIDELINES
DNR Order/No artificial nutrition/No antibiotics/No blood draws/Comfort measures only/Do not re-hospitalize

## 2019-08-12 NOTE — PROGRESS NOTE ADULT - SUBJECTIVE AND OBJECTIVE BOX
OVERNIGHT EVENTS: None    SUBJECTIVE / INTERVAL HPI: Patient seen and examined at bedside. Patient resting in bed with no complaints. He is AAOx1-2. Denies chest pain, sob, abdominal pain.     VITAL SIGNS:  Vital Signs Last 24 Hrs  T(C): 37.1 (12 Aug 2019 12:04), Max: 37.1 (12 Aug 2019 12:04)  T(F): 98.7 (12 Aug 2019 12:04), Max: 98.7 (12 Aug 2019 12:04)  HR: 106 (12 Aug 2019 19:09) (65 - 106)  BP: 202/98 (12 Aug 2019 19:09) (176/80 - 227/88)  BP(mean): --  RR: 20 (12 Aug 2019 19:09) (16 - 25)  SpO2: 100% (12 Aug 2019 19:09) (97% - 100%)    PHYSICAL EXAM:    General: WDWN, resting comfortably in bed on HFNC   HEENT: NC/AT; PERRL, anicteric sclera; MMM  Neck: supple  Cardiovascular: +S1/S2; RRR  Respiratory: diffuse rhonchi b/l; no W/R/R  Gastrointestinal: soft, NT/ND; +BSx4  Extremities: WWP; no edema, clubbing or cyanosis  Vascular: 2+ radial, DP/PT pulses B/L  Neurological: AAOx1-2; no focal deficits    MEDICATIONS:  MEDICATIONS  (STANDING):  ALBUTerol/ipratropium for Nebulization 3 milliLiter(s) Nebulizer every 6 hours  cloNIDine Patch 0.1 mG/24Hr(s) 1 patch Transdermal every 7 days  enalaprilat Injectable 3.375 milliGRAM(s) IV Push every 6 hours  lamoTRIgine 100 milliGRAM(s) Oral daily  lidocaine   Patch 1 Patch Transdermal every 24 hours  morphine  - Injectable 2 milliGRAM(s) IV Push every 4 hours  risperiDONE   Tablet 0.25 milliGRAM(s) Oral every 24 hours    MEDICATIONS  (PRN):  morphine  - Injectable 1 milliGRAM(s) IV Push every 2 hours PRN Breakthrough pain  morphine  - Injectable 2 milliGRAM(s) IV Push every 4 hours PRN Severe Pain (7 - 10)      ALLERGIES:  Allergies    Augmentin (Rash)  Keflex (Unknown)    Intolerances        LABS:                        10.3   7.04  )-----------( 235      ( 12 Aug 2019 05:47 )             33.1     08-12    133<L>  |  98  |  35<H>  ----------------------------<  99  4.2   |  22  |  0.95    Ca    9.9      12 Aug 2019 05:47  Mg     2.1     08-12          CAPILLARY BLOOD GLUCOSE          RADIOLOGY & ADDITIONAL TESTS: Reviewed. OVERNIGHT EVENTS: None    SUBJECTIVE / INTERVAL HPI: Patient seen and examined at bedside. Patient resting in bed with no complaints. He is AAOx1-2. Denies chest pain, sob, abdominal pain.     VITAL SIGNS:  Vital Signs Last 24 Hrs  T(C): 37.1 (12 Aug 2019 12:04), Max: 37.1 (12 Aug 2019 12:04)  T(F): 98.7 (12 Aug 2019 12:04), Max: 98.7 (12 Aug 2019 12:04)  HR: 106 (12 Aug 2019 19:09) (65 - 106)  BP: 202/98 (12 Aug 2019 19:09) (176/80 - 227/88)  BP(mean): --  RR: 20 (12 Aug 2019 19:09) (16 - 25)  SpO2: 100% (12 Aug 2019 19:09) (97% - 100%)    PHYSICAL EXAM:    General: WDWN, resting comfortably in bed on HFNC   HEENT: NC/AT; PERRL, anicteric sclera; MMM  Neck: supple  Cardiovascular: +S1/S2; RRR  Respiratory: diffuse rhonchi b/l; no W/R/R  Gastrointestinal: soft, NT/ND; +BSx4  Extremities: WWP; no edema, clubbing or cyanosis  Vascular: 2+ radial, DP/PT pulses B/L  Neurological: AAOx1-2; no focal deficits    MEDICATIONS:  MEDICATIONS  (STANDING):  ALBUTerol/ipratropium for Nebulization 3 milliLiter(s) Nebulizer every 6 hours  cloNIDine Patch 0.1 mG/24Hr(s) 1 patch Transdermal every 7 days  enalaprilat Injectable 3.375 milliGRAM(s) IV Push every 6 hours  lamoTRIgine 100 milliGRAM(s) Oral daily  lidocaine   Patch 1 Patch Transdermal every 24 hours  morphine  - Injectable 2 milliGRAM(s) IV Push every 4 hours  risperiDONE   Tablet 0.25 milliGRAM(s) Oral every 24 hours    MEDICATIONS  (PRN):  morphine  - Injectable 1 milliGRAM(s) IV Push every 2 hours PRN Breakthrough pain  morphine  - Injectable 2 milliGRAM(s) IV Push every 4 hours PRN Severe Pain (7 - 10)      ALLERGIES:  Allergies    Augmentin (Rash)  Keflex (Unknown)    Intolerances        LABS:                        10.3   7.04  )-----------( 235      ( 12 Aug 2019 05:47 )             33.1     08-12    133<L>  |  98  |  35<H>  ----------------------------<  99  4.2   |  22  |  0.95    Ca    9.9      12 Aug 2019 05:47  Mg     2.1     08-12          RADIOLOGY & ADDITIONAL TESTS: Reviewed.

## 2019-08-12 NOTE — GOALS OF CARE CONVERSATION - PERSONAL ADVANCE DIRECTIVE - CONVERSATION DETAILS
Met with pt wife and 4 children. Discussed family desire to carry out pt known EOL care wishes  -pt is to die at home with family surrounding him, referral made to [facility]  for D/C home 8/13. pt has 24 hr caregivers and a very supportive and attentive family to assist with care  -DNR/DNI  -NO further abx, blood draws, lab work  -No escalation of care, O2 N/C  for comfort only, no use of BIPAP, Hi Owen, VM or NRB  -  Vital signs once daily  -MEWS exempt     Comfort and maintenance of dignity should be the foundation for all care moving forward

## 2019-08-12 NOTE — DIETITIAN INITIAL EVALUATION ADULT. - PROBLEM SELECTOR PLAN 5
history of HTN, elevated BP on admission 170-198/  -c/w enalaprilat   -currently holding PO medications in setting of BIPAP

## 2019-08-12 NOTE — CONSULT NOTE ADULT - PROBLEM SELECTOR RECOMMENDATION 4
increasing SOB over weekend  O2 N/C in situ  morphine as above for pain pt with frequent systolic 200-230  per Dr ramires, pt normotensive at home  has been managed on Vasotec IV in hospital  consider Catapres patch in anticipation of D/C home on hospice

## 2019-08-12 NOTE — DIETITIAN INITIAL EVALUATION ADULT. - OTHER INFO
89 y/o male with COPD/CAD s/p stents ,TIA, CHF, BPH, DM type 2 admitted with multifocal pneumonia. No N/V/D. Pain controlled with noted meds. Skin with fracture and nose of bridge scar. SLP consult noted. Plans for GOC to be home hospice .Presently with negligible po intake. Palliative following. Comfort feeds. ,

## 2019-08-12 NOTE — DIETITIAN INITIAL EVALUATION ADULT. - PROBLEM SELECTOR PLAN 1
hypoxic respiratory failure from multifocal pneumonia. Sating at 85 at initial presentation. Patient is DNR/DNI  -c/w BIBPAP  -Palliative recs  fitted the bipap mask and will attempt to change to HFNC in am

## 2019-08-12 NOTE — PROGRESS NOTE ADULT - PROBLEM SELECTOR PLAN 2
Chest XR showing multifocal consolidation throughout R lung with small R pleural effusion. CT showing Multifocal pneumonia, multiple right-sided rib fractures and small right pleural effusion   -DC levofloxacin   -BloodCx NGTD

## 2019-08-12 NOTE — CONSULT NOTE ADULT - PROBLEM SELECTOR RECOMMENDATION 6
see separate GOC note Support provided to patient and family. Patient to have access to supportive services during rest of hospital stay as the patient/family deemed necessary ie. Chaplaincy, Massage therapy, Music therapy, Patient and family supportive services, Palliative SW, etc.    As discussed during the palliative IDT meeting and as identified during the patients PSSA screening the patient would benefit from: SW, Pet therapy

## 2019-08-12 NOTE — PROGRESS NOTE ADULT - PROBLEM SELECTOR PLAN 4
Chest XR on admission showing R clavicular fracture. Patient fell from chair 5 days ago at home without LOC, did not seek medical attention as patient had full ROM at the time. CT showing R sided acute rib fractures  -pain control: Morphine 2 mg IV q4 prn with Morphine 1 mg IV q 2 hr prn   -conservative management  -no surgical consult given family preference for comfort

## 2019-08-12 NOTE — PROGRESS NOTE ADULT - PROBLEM SELECTOR PLAN 5
history of HTN, elevated BP on admission 170-198/  -po HTN meds held in setting of aspiration risk   -enalaprilat 3.375mg q6hrs  -Catapres  -Can also give hydralazine 5mg or 10mg IV if SBP continues to be x>180

## 2019-08-12 NOTE — DIETITIAN INITIAL EVALUATION ADULT. - DIET TYPE
oral only bites of thin liquids.Palliative patient .GOC home with hospice dysphagia 1, pureed, thin liquids

## 2019-08-12 NOTE — DIETITIAN INITIAL EVALUATION ADULT. - PROBLEM SELECTOR PLAN 2
patient presenting with worsening cough x1day with sputum production and shortness of breath since 1 day ago. Chest XR showing multifocal consolidation throughout R lung with small R pleural effusion. Was given levofloxacin 750mg IV  -c/w levofloxacin   -follow cultures

## 2019-08-12 NOTE — PROGRESS NOTE ADULT - PROBLEM SELECTOR PLAN 7
history of dCHF, BNP 7977 on admission  -monitor fluid status closely
history of dCHF, BNP 7977 on admission  -monitor fluid status closely

## 2019-08-12 NOTE — CONSULT NOTE ADULT - ASSESSMENT
89M with PMH HTN, HLD, CAD (s/p 2 stents), PPM, TIA 7/2015, Diastolic CHF (EF 65% in 2017), Dementia (AOx3 at baseline), COPD (not on home O2) who presents for worsening cough for 1 week and SOB found to have multifocal PNA on R, ICU consulted for hypoxic hypercapnic respiratory failure.     #Hypoxic hypercapnic respiratory failure  -2/2 R sided multifocal PNA in setting of underlying COPD  -ABG showing metabolic alkalosis and and hypercapnia; appearing to have chronic compensation for hypercapnia     -discussion regarding GOC had with family and wife (HCP) and living will and advanced directive in chart; wife and family honoring patient's wishes for DNR/DNI and no escalation of care; pt would like pt to have home hospice and focus on comfort. they would like to continue BiPAP to help WOB at this time as well as antibiotics. however, they do not want pt to be escalated to ICU setting with pressors or other invasive procedures. family desiring palliative consult and plan for home hospice as pt outlining such wishes in his advanced directive.   -c/w BiPAP for WOB  -c/w levaquin for CAP and pseudomonal coverage given underlying COPD and allergy to PCN and cephalosporins   -palliative care consult  -DNR/DNI  -no escalation of care    Dispo: [facility] for palliative measures and comfort needs  Discussed with attending
89 year old M with COPD, HTN, HLD, CAD (s/p 2 stents), dCHF, TIA (2015), PPM, BPH, DM2, and history of MI (2012) who presenting with shortness of breath, worsening cough, and clavicular fracture s/p mechanical fall 5 days ago. Patient fell at home from chair 5 days ago but did not go to the hospital for the incident, daughters report that he had full ROM of both shoulders. There was no LOC, daughters were not present at the time of the fall. One day ago he began to have worsening chronic cough with some sputum production.

## 2019-08-13 ENCOUNTER — TRANSCRIPTION ENCOUNTER (OUTPATIENT)
Age: 84
End: 2019-08-13

## 2019-08-13 VITALS
SYSTOLIC BLOOD PRESSURE: 195 MMHG | HEART RATE: 68 BPM | RESPIRATION RATE: 17 BRPM | OXYGEN SATURATION: 94 % | DIASTOLIC BLOOD PRESSURE: 77 MMHG

## 2019-08-13 PROCEDURE — 99233 SBSQ HOSP IP/OBS HIGH 50: CPT

## 2019-08-13 RX ORDER — RISPERIDONE 4 MG/1
1 TABLET ORAL
Qty: 0 | Refills: 0 | DISCHARGE
Start: 2019-08-13

## 2019-08-13 RX ORDER — HYDRALAZINE HCL 50 MG
10 TABLET ORAL ONCE
Refills: 0 | Status: COMPLETED | OUTPATIENT
Start: 2019-08-13 | End: 2019-08-13

## 2019-08-13 RX ORDER — IPRATROPIUM/ALBUTEROL SULFATE 18-103MCG
3 AEROSOL WITH ADAPTER (GRAM) INHALATION
Qty: 0 | Refills: 0 | DISCHARGE
Start: 2019-08-13

## 2019-08-13 RX ADMIN — Medication 3.38 MILLIGRAM(S): at 05:57

## 2019-08-13 RX ADMIN — LAMOTRIGINE 100 MILLIGRAM(S): 25 TABLET, ORALLY DISINTEGRATING ORAL at 11:37

## 2019-08-13 RX ADMIN — Medication 3 MILLILITER(S): at 03:17

## 2019-08-13 RX ADMIN — Medication 3.38 MILLIGRAM(S): at 00:11

## 2019-08-13 RX ADMIN — MORPHINE SULFATE 2 MILLIGRAM(S): 50 CAPSULE, EXTENDED RELEASE ORAL at 01:59

## 2019-08-13 RX ADMIN — MORPHINE SULFATE 1 MILLIGRAM(S): 50 CAPSULE, EXTENDED RELEASE ORAL at 05:57

## 2019-08-13 RX ADMIN — MORPHINE SULFATE 2 MILLIGRAM(S): 50 CAPSULE, EXTENDED RELEASE ORAL at 11:23

## 2019-08-13 RX ADMIN — Medication 1 PATCH: at 06:53

## 2019-08-13 RX ADMIN — Medication 10 MILLIGRAM(S): at 11:36

## 2019-08-13 RX ADMIN — MORPHINE SULFATE 1 MILLIGRAM(S): 50 CAPSULE, EXTENDED RELEASE ORAL at 04:17

## 2019-08-13 RX ADMIN — MORPHINE SULFATE 1 MILLIGRAM(S): 50 CAPSULE, EXTENDED RELEASE ORAL at 08:54

## 2019-08-13 RX ADMIN — MORPHINE SULFATE 1 MILLIGRAM(S): 50 CAPSULE, EXTENDED RELEASE ORAL at 09:51

## 2019-08-13 RX ADMIN — MORPHINE SULFATE 2 MILLIGRAM(S): 50 CAPSULE, EXTENDED RELEASE ORAL at 05:57

## 2019-08-13 NOTE — PROGRESS NOTE ADULT - PROBLEM SELECTOR PLAN 5
Support provided to patient and family. Patient to have access to supportive services during rest of hospital stay as the patient/family deemed necessary ie. Chaplaincy, Massage therapy, Music therapy, Patient and family supportive services, Palliative SW, etc.    As discussed during the palliative IDT meeting and as identified during the patients PSSA screening the patient would benefit from: SW, Pet therapy.

## 2019-08-13 NOTE — PROGRESS NOTE ADULT - PROBLEM SELECTOR PROBLEM 3
Hypertension
Chronic obstructive pulmonary disease
Hyponatremia

## 2019-08-13 NOTE — PROGRESS NOTE ADULT - REASON FOR ADMISSION
Multifocal pneumonia

## 2019-08-13 NOTE — DISCHARGE NOTE PROVIDER - HOSPITAL COURSE
Patient is 89yo M with past medical history of _____        Presented with _____, found to have _____    Problem List/Main Diagnoses (system-based):     Inpatient treatment course:     New medications:     Labs to be followed outpatient:     Exam to be followed outpatient: Patient is 89yo M with past medical history of COPD, HTN, HLD, CAD (s/p 2 stents), dCHF, TIA (2015), PPM, BPH, DM2, and history of MI (2012).     Presented with shortness of breath, worsening cough, and clavicular fracture s/p mechanical fall 8/4/19. Found to have multifocal pneumonia and clavicular fracture.          Problem List/Main Diagnoses:    #Respiratory Failure with Hypoxia     -Initially placed on BIPAP then transitioned to HFNC and then to 5-6L NC saturating at 97-99%        #Multifocal Pneumonia     -CXR: dense multifocal consolidation throughout the R lung     -CTA chest: no PE, R sided rib fractures     -CT abd/pelvis: no acute findings    -Blood cultures show no growth to date    -Started on Levofloxacin, but discontinued per family's request     -Patient and family have decided on comfort care at this time and home hospice         #Clavicular Fracture     -Pain Management, Morphine 2mg IVP PRN q2, Morphine 1mg IVP PRN q1, lidocaine patch     -Palliative consulted         #Chronic Obstructive Pulmonary Disease     -duonebs        #Hypertension     -Enalaprilat 3.375mg IVP q6    -Clonidine patch 0.1mg every 7 days        #Hyponatremia     -Na 131-134, stable     -Fluid status monitored         #Diastolic CHF         #Diabetes Type II         Per patient and patient's family palliative care was consulted. Patient will be going home with home hospice. Patient is 89yo M with past medical history of COPD, HTN, HLD, CAD (s/p 2 stents), dCHF, TIA (2015), PPM, BPH, DM2, and history of MI (2012).     Presented with shortness of breath, worsening cough, and clavicular fracture s/p mechanical fall 8/4/19. Found to have R sided multifocal pneumonia and clavicular fracture.          Problem List/Main Diagnoses:    #Respiratory Failure with Hypoxia     -Initially placed on BIPAP then transitioned to HFNC and then to 5-6L NC saturating at 97-99%        #Multifocal Pneumonia     -CXR: dense multifocal consolidation throughout the R lung     -CTA chest: no PE, R sided rib fractures     -CT abd/pelvis: no acute findings    -Blood cultures show no growth to date    -Started on Levofloxacin, but discontinued per family's request     -Patient and family have decided on comfort care at this time and home hospice         #Clavicular Fracture     -Pain Management, Morphine 2mg IVP PRN q2, Morphine 1mg IVP PRN q1, lidocaine patch     -Palliative consulted         #Chronic Obstructive Pulmonary Disease     -duonebs        #Hypertension     -Enalaprilat 3.375mg IVP q6    -Clonidine patch 0.1mg every 7 days        #Hyponatremia     -Na 131-134, stable     -Fluid status monitored         #Diastolic CHF         #Diabetes Type II         Per patient and patient's family palliative care was consulted. Patient will be going home with home hospice.

## 2019-08-13 NOTE — PROGRESS NOTE ADULT - PROBLEM SELECTOR PLAN 1
chest XRay 8/9 revealed right sided multifocal consolidation   Family wishes for no further abt at this time  ABT at this time.   worsening cough over the weekend  May consider tesillon pearls  Oral suctioning only  May consider Scopolamine patch to dry up some of the secretions  continue med nebs.

## 2019-08-13 NOTE — DISCHARGE NOTE NURSING/CASE MANAGEMENT/SOCIAL WORK - NSDCDPATPORTLINK_GEN_ALL_CORE
You can access the [facility] Patient Portal, offered by [facility], by registering with the following website: [email or web address]

## 2019-08-13 NOTE — PROGRESS NOTE ADULT - PROBLEM SELECTOR PROBLEM 1
Multifocal pneumonia
Acute respiratory failure with hypoxia
Respiratory failure with hypoxia
Respiratory failure with hypoxia
Acute respiratory failure with hypoxia

## 2019-08-13 NOTE — DISCHARGE NOTE PROVIDER - NSDCCPCAREPLAN_GEN_ALL_CORE_FT
PRINCIPAL DISCHARGE DIAGNOSIS  Diagnosis: Pneumonia  Assessment and Plan of Treatment: You presented with worsening shortness of breath and cough. A chest xray was perfmored which showed you have Pneumonia. Antibiotics were started but then discontinued as you and your family requested. We have amanged your breathing with oxygen via a BIPAP initially. You were then switched to high flow nasal cannula and then 5-6L nasal cannula. Please continue to use oxygen at home as needed. Palliative care was called to see you per your requtes. At this time it has been decided by you and your family to go home with comfort measures in place.      SECONDARY DISCHARGE DIAGNOSES  Diagnosis: Clavicular fracture  Assessment and Plan of Treatment: Your pain is being managed with medications as toelrated.    Diagnosis: Hypertension  Assessment and Plan of Treatment: PRINCIPAL DISCHARGE DIAGNOSIS  Diagnosis: Pneumonia  Assessment and Plan of Treatment: You presented with worsening shortness of breath and cough. A chest xray was performed which showed you have Pneumonia. Antibiotics were started but then discontinued as you and your family requested. We have managed your breathing with oxygen via a BIPAP initially. You were then switched to high flow nasal cannula and then 5-6L nasal cannula. Please continue to use oxygen at home as needed. Palliative care was called to see you per your request. At this time it has been decided by you and your family to go home with comfort measures in place.      SECONDARY DISCHARGE DIAGNOSES  Diagnosis: Clavicular fracture  Assessment and Plan of Treatment: Your pain is being managed with medications as tolerated. Please continue the morphine as needed as needed.    Diagnosis: Hypertension  Assessment and Plan of Treatment: Your blood pressure has been elevated. You have been treated with Vasotec and Clonidine patch. Please continue to use the Clonidine patch at home.

## 2019-08-13 NOTE — PROGRESS NOTE ADULT - NSHPATTENDINGPLANDISCUSS_GEN_ALL_CORE
patient wife family and team
wife daughter son and patient
Dr Peterson, Hospice RN liasion
Dr. Dodson
family

## 2019-08-13 NOTE — PROGRESS NOTE ADULT - PROBLEM SELECTOR PROBLEM 5
[FreeTextEntry8] : c/o elevated blood sugar - reading of 180 this morning, +HA   Also concerned about persisting head congestion/ runny nose. 
Palliative care by specialist
DMII (diabetes mellitus, type 2)
Hypertension
Hypertension
Type 2 diabetes mellitus without complication, without long-term current use of insulin

## 2019-08-13 NOTE — PROGRESS NOTE ADULT - PROBLEM SELECTOR PLAN 6
Admitted with , now 134 (improving)   -trend BMP  -monitor fluid status daily
for transfer home with [facility] hospice services. Children and private hire HA at bedside. Ambulance set up for noon.
BP stable  continue enalaprilat and monitoring
Admitted with , now 134 (improving)   -trend BMP  -monitor fluid status daily
continue inhalers no evidence of AECOPD

## 2019-08-13 NOTE — PROGRESS NOTE ADULT - ASSESSMENT
89 yo man admitted with multilobar pna in the setting of recent fall with multiple right sided rib fractures  =DNR DNI  -in conversation with the patients wife and family and having known him for over 20 years he very clearly does not want exceptional measures taken to preserve his life and even now asks when it will be over and wants to go home  -PLAN - his oxygen requirements have improved, comfort is manageable with morphine, will consult with palliative care in the morning for home hospice where I will remain his physician  -DISPO - home with palliative care services in place
89 year old M with COPD, HTN, HLD, CAD (s/p 2 stents), dCHF, TIA (2015), PPM, BPH, DM2, and history of MI (2012) who presenting with shortness of breath, worsening cough, and clavicular fracture s/p mechanical fall 5 days ago. Patient fell at home from chair 5 days ago but did not go to the hospital for the incident, daughters report that he had full ROM of both shoulders. There was no LOC, daughters were not present at the time of the fall. One day ago he began to have worsening chronic cough with some sputum production.
Hemodynamically stable with improving symptoms of pneumonia  -he has been sweaned from hiflo O2 to 6 liters nasal canula today and maintained sts >95%  -He remains hypertensive - start catarpes patch  -Palliative care consult appreciated - patient will be dc'd home and I will remain attending  =home MSO4 [id number]  =catarpes patch ordered  Family is aware and in agreement with plan to contiunue antibiotics and home psych meds
89 year old Male with PMH HTN, HLD, DM, TIA, CHF, PPM, CAD, COPD, MI, depression/anxiety, who presented with a chief complaint of SOB, diagnosed with Multifocal pneumonia on levaquin and oxygen currently doing well.
89 year old M with COPD, HTN, HLD, CAD (s/p 2 stents), dCHF, TIA (2015), PPM, BPH, DM2, and history of MI (2012) who presenting with shortness of breath, worsening cough, and clavicular fracture s/p mechanical fall 5 days ago being admitted for multifocal pneumonia and clavicular fractures, now DNR/DNI, with plan for palliative care
89 year old M with COPD, HTN, HLD, CAD (s/p 2 stents), dCHF, TIA (2015), PPM, BPH, DM2, and history of MI (2012) who presenting with shortness of breath, worsening cough, and clavicular fracture s/p mechanical fall 5 days ago being admitted for multifocal pneumonia and clavicular fractures, now DNR/DNI, with plan for palliative care

## 2019-08-13 NOTE — PROGRESS NOTE ADULT - SUBJECTIVE AND OBJECTIVE BOX
TONIE GEORGE   MRN-[id number]    : 3/5/1926     HPI:  Source of information are daughters.   89 year old M with COPD, HTN, HLD, CAD (s/p 2 stents), dCHF, TIA (), PPM, BPH, DM2, and history of MI () who presenting with shortness of breath, worsening cough, and clavicular fracture s/p mechanical fall 5 days ago. Patient fell at home from chair 5 days ago but did not go to the hospital for the incident, daughters report that he had full ROM of both shoulders. There was no LOC, daughters were not present at the time of the fall. One day ago he began to have worsening chronic cough with some sputum production. Patient complains of R shoulder pain and feeling cold. No fever, chills, chest pain, abdominal pain, or urinary symptoms. Patient has an extensive medication list. Patient lives at home with wife with a home health aid that visits daily. He uses a wheelchair to move around.   Daughters aware and in agreement with respecting patient's wishes of being DNR/DNI. Last visit was in 2016 for AMS    In the ED patient presented with T 98.8, HR 67-87, RR 18-22, SpO2 85 RA - 100 hi-flow. WBC 6.71, hgb 8.6, Na 131, BUN 49, Alk-P 129, BNP 7977. CT head negative, Chest XR significant for multifocal consolidation throughout R lung, small R pleural effusion. In the ED he was given enalaprilat 1.25mg IV, levofloxacin 750mg IV, 500ml NS, and duoneb. (09 Aug 2019 15:15)    Palliative Medicine is consulted to assist with symptom management and good EOL symptom management       Subjective: pt appears weak and frail, clinically dry. Family at bedside anxious to get home        ROS:                      Dyspnea (Napoleon 0-10): 2/10                       N/V (Y/N): N                             Secretions (Y/N) : Y               Agitation(Y/N): Y  Pain (Y/N): pt with generalized body pain  unable to qualify or quantify just says "ow"    -Provocation/Palliation:  -Quality/Quantity:  -Radiating:  -Severity:  -Timing/Frequency:  -Impact on ADLs:      Allergies    Augmentin (Rash)  Keflex (Unknown)    Intolerances    Opiate Naive (Y/N): YES  -iStop reviewed (Y/N): YES  on initial consultation I Ref: [id number]    Medications:      MEDICATIONS  (STANDING):  ALBUTerol/ipratropium for Nebulization 3 milliLiter(s) Nebulizer every 6 hours  cloNIDine Patch 0.1 mG/24Hr(s) 1 patch Transdermal every 7 days  enalaprilat Injectable 3.375 milliGRAM(s) IV Push every 6 hours  lamoTRIgine 100 milliGRAM(s) Oral daily  lidocaine   Patch 1 Patch Transdermal every 24 hours  morphine  - Injectable 2 milliGRAM(s) IV Push every 4 hours  risperiDONE   Tablet 0.25 milliGRAM(s) Oral every 24 hours    MEDICATIONS  (PRN):  morphine  - Injectable 1 milliGRAM(s) IV Push every 2 hours PRN Breakthrough pain  morphine  - Injectable 2 milliGRAM(s) IV Push every 4 hours PRN Severe Pain (7 - 10)    Labs:  no further labwork    Imaging:  no further imaging       EXAM:  CT ANGIO CHEST PE PROTOCOL IC                          EXAM:  CT ABDOMEN AND PELVIS IC                          PROCEDURE DATE:  2019        INTERPRETATION:  Clinical information: Pneumonia, dyspnea, abdominal   pain, rule out PE    Comparison: None    PROCEDURE:   CT angiographic of the Chest followed by CT abdomen and pelvis was   performed with intravenous contrast. 3-D MIPS were submitted.  90ml of Omnipaque 350 was injected intravenously. 10cc was discarded.    FINDINGS:    CHEST:    CHEST WALL AND LOWER NECK: Within normal limits  MEDIASTINUM AND DEVEN: Within normal limits  HEART: Within normal limits  VESSELS: No central pulmonary embolism. Subsegmental branches is limited   by motion.  LUNG AND LARGE AIRWAYS: Bibasilar endobronchial mucous. Bilateral   perihilar right greater than left airspace opacities. Multiple nodular   opacities right upper lobe. Small right pleural effusion. No pneumothorax.    ABDOMEN:  LIVER: Within normal limits  BILE DUCTS: Normal caliber  GALLBLADDER: No calcified gallstones. Normal caliber wall  PANCREAS: within normal limits  SPLEEN: within normal limits  ADRENALS: within normal limits  KIDNEYS: Multiple cysts    PELVIS:  REPRODUCTIVE ORGANS: Enlarged prostate  BLADDER: within normal limits    BOWEL: Large fecal load in the colon. Sigmoid diverticulosis.  PERITONEUM: No ascites or free air, no fluid collection    VESSELS: Diffuse arterial calcifications. No occlusive mesenteric   thrombus.  RETROPERITONEUM: No enlarged retroperitoneal or pelvic nodes  ABDOMINAL WALL: Within normal limits  BONES: Multiple acute fractures of the right posterior third through   ninth and right anterior fourth through eighth ribs. Multiple old   left-sided rib fractures.    IMPRESSION:     No central pulmonary embolism.    Multifocal pneumonia.    Multiple right-sided rib fractures as described, correlate for clinical   chest. Small right pleural effusion. No pneumothorax.  No acute findings abdomen pelvis. Incidental comments as above.    PEx:  Vital Signs Last 24 Hrs  T(C): 36.9 (13 Aug 2019 09:36), Max: 36.9 (13 Aug 2019 09:36)  T(F): 98.4 (13 Aug 2019 09:36), Max: 98.4 (13 Aug 2019 09:36)  HR: 68 (13 Aug 2019 12:06) (68 - 108)  BP: 195/77 (13 Aug 2019 12:06) (192/81 - 234/113)  BP(mean): --  RR: 17 (13 Aug 2019 12:06) (17 - 20)  SpO2: 94% (13 Aug 2019 12:06) (94% - 100%)    General: elderly weak and frail male with c/o back and shoulder pain  HEENT:  NC/AT, scelra anicteric, MM dry  Neck: supple, no JVD  CVS: S1S2 irregularly irregular, no murmur  Resp: diffuse rhonchi B/L  GI: soft NT passing flatus,  :  decreasing urine output  Musc:  weakness   Neuro: lethargic, aroused to name but falls asleep, cannot follow commands  Psych: calm    Skin: no rashes observed  Lymph: No LAD  Preadmit Karnofsky: 30 %           Current Karnofsky: 10-20  %  Cachexia (Y/N): Y  BMI:2.4    Advanced Directives:    DNR/DNI    MOLST       Decision maker: pt does not have capacity to make complex medical decisions   HCP:  Sherry Corley Elviamarskkimmy: wife [phone]  Alternate proxy: Sheryl George: dgt: [phone]  Other Local contacts: children  Chichi Hannah [phone]  Pravin Hannah [phone]  Reece Hannah [phone]      GOALS OF CARE DISCUSSION       Palliative care info/counseling provided	           Family meeting      Advanced Directives addressed please see Goals of Care            Documentation of GOC: for HH 	          REFERRALS	        Palliative Med        Unit SW/Case Mgmt        Speech/Swallow        Hospice: [facility]

## 2019-08-13 NOTE — PROGRESS NOTE ADULT - PROVIDER SPECIALTY LIST ADULT
Internal Medicine
Cardiology
Cardiology
Internal Medicine
Palliative Care

## 2019-08-13 NOTE — PROGRESS NOTE ADULT - PROBLEM SELECTOR PLAN 2
pt c/o overall generalized pain  with recent fall, scapula fx, severe stenosis Family asking for ATC coverage of analgesia  Consider Morphine 2 mg IV q 4 hr ATC with Morphine 1 mg IV q 2 hr prn pain   add Colace liquid  Dulocolax supp prn.

## 2019-08-13 NOTE — DISCHARGE NOTE NURSING/CASE MANAGEMENT/SOCIAL WORK - NSDCPEPTSTRK_GEN_ALL_CORE
Risk factors for stroke/Stroke support groups for patients, families, and friends/Call 911 for stroke/Need for follow up after discharge/Stroke education booklet/Prescribed medications/Stroke warning signs and symptoms/Signs and symptoms of stroke

## 2019-08-13 NOTE — PROGRESS NOTE ADULT - PROBLEM SELECTOR PLAN 3
BP remains high,  190's -230's/  Captapres patch placed yesterday  Dr Peterson aware  and will monitor upon D/C

## 2019-08-14 LAB
CULTURE RESULTS: SIGNIFICANT CHANGE UP
CULTURE RESULTS: SIGNIFICANT CHANGE UP
SPECIMEN SOURCE: SIGNIFICANT CHANGE UP
SPECIMEN SOURCE: SIGNIFICANT CHANGE UP

## 2019-08-20 DIAGNOSIS — I11.0 HYPERTENSIVE HEART DISEASE WITH HEART FAILURE: ICD-10-CM

## 2019-08-20 DIAGNOSIS — I25.2 OLD MYOCARDIAL INFARCTION: ICD-10-CM

## 2019-08-20 DIAGNOSIS — J44.0 CHRONIC OBSTRUCTIVE PULMONARY DISEASE WITH (ACUTE) LOWER RESPIRATORY INFECTION: ICD-10-CM

## 2019-08-20 DIAGNOSIS — S42.001A FRACTURE OF UNSPECIFIED PART OF RIGHT CLAVICLE, INITIAL ENCOUNTER FOR CLOSED FRACTURE: ICD-10-CM

## 2019-08-20 DIAGNOSIS — Z86.73 PERSONAL HISTORY OF TRANSIENT ISCHEMIC ATTACK (TIA), AND CEREBRAL INFARCTION WITHOUT RESIDUAL DEFICITS: ICD-10-CM
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
## 2019-08-20 DIAGNOSIS — Z87.891 PERSONAL HISTORY OF NICOTINE DEPENDENCE: ICD-10-CM

## 2019-08-20 DIAGNOSIS — J18.9 PNEUMONIA, UNSPECIFIED ORGANISM: ICD-10-CM

## 2019-08-20 DIAGNOSIS — Z51.5 ENCOUNTER FOR PALLIATIVE CARE: ICD-10-CM

## 2019-08-20 DIAGNOSIS — E87.1 HYPO-OSMOLALITY AND HYPONATREMIA: ICD-10-CM

## 2019-08-20 DIAGNOSIS — I50.32 CHRONIC DIASTOLIC (CONGESTIVE) HEART FAILURE: ICD-10-CM

## 2019-08-20 DIAGNOSIS — N40.0 BENIGN PROSTATIC HYPERPLASIA WITHOUT LOWER URINARY TRACT SYMPTOMS: ICD-10-CM

## 2019-08-20 DIAGNOSIS — E11.9 TYPE 2 DIABETES MELLITUS WITHOUT COMPLICATIONS: ICD-10-CM

## 2019-08-20 DIAGNOSIS — J96.01 ACUTE RESPIRATORY FAILURE WITH HYPOXIA: ICD-10-CM
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
## 2019-08-20 DIAGNOSIS — Z66 DO NOT RESUSCITATE: ICD-10-CM

## 2019-08-20 DIAGNOSIS — Z95.0 PRESENCE OF CARDIAC PACEMAKER: ICD-10-CM

## 2019-08-20 DIAGNOSIS — I25.10 ATHEROSCLEROTIC HEART DISEASE OF NATIVE CORONARY ARTERY WITHOUT ANGINA PECTORIS: ICD-10-CM
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
## 2019-08-20 DIAGNOSIS — J96.02 ACUTE RESPIRATORY FAILURE WITH HYPERCAPNIA: ICD-10-CM

## 2019-10-01 PROCEDURE — 82553 CREATINE MB FRACTION: CPT

## 2019-10-01 PROCEDURE — 85610 PROTHROMBIN TIME: CPT

## 2019-10-01 PROCEDURE — 83735 ASSAY OF MAGNESIUM: CPT

## 2019-10-01 PROCEDURE — 94660 CPAP INITIATION&MGMT: CPT

## 2019-10-01 PROCEDURE — 93005 ELECTROCARDIOGRAM TRACING: CPT

## 2019-10-01 PROCEDURE — 92610 EVALUATE SWALLOWING FUNCTION: CPT

## 2019-10-01 PROCEDURE — 96375 TX/PRO/DX INJ NEW DRUG ADDON: CPT | Mod: XU

## 2019-10-01 PROCEDURE — 71045 X-RAY EXAM CHEST 1 VIEW: CPT

## 2019-10-01 PROCEDURE — 71275 CT ANGIOGRAPHY CHEST: CPT

## 2019-10-01 PROCEDURE — 94640 AIRWAY INHALATION TREATMENT: CPT

## 2019-10-01 PROCEDURE — 84295 ASSAY OF SERUM SODIUM: CPT

## 2019-10-01 PROCEDURE — 82550 ASSAY OF CK (CPK): CPT

## 2019-10-01 PROCEDURE — 85027 COMPLETE CBC AUTOMATED: CPT

## 2019-10-01 PROCEDURE — 84484 ASSAY OF TROPONIN QUANT: CPT

## 2019-10-01 PROCEDURE — 87040 BLOOD CULTURE FOR BACTERIA: CPT

## 2019-10-01 PROCEDURE — 80053 COMPREHEN METABOLIC PANEL: CPT

## 2019-10-01 PROCEDURE — 82803 BLOOD GASES ANY COMBINATION: CPT

## 2019-10-01 PROCEDURE — 83605 ASSAY OF LACTIC ACID: CPT

## 2019-10-01 PROCEDURE — 85025 COMPLETE CBC W/AUTO DIFF WBC: CPT

## 2019-10-01 PROCEDURE — 83880 ASSAY OF NATRIURETIC PEPTIDE: CPT

## 2019-10-01 PROCEDURE — 99285 EMERGENCY DEPT VISIT HI MDM: CPT | Mod: 25

## 2019-10-01 PROCEDURE — 92526 ORAL FUNCTION THERAPY: CPT

## 2019-10-01 PROCEDURE — 80048 BASIC METABOLIC PNL TOTAL CA: CPT

## 2019-10-01 PROCEDURE — 36415 COLL VENOUS BLD VENIPUNCTURE: CPT

## 2019-10-01 PROCEDURE — 82330 ASSAY OF CALCIUM: CPT

## 2019-10-01 PROCEDURE — 85730 THROMBOPLASTIN TIME PARTIAL: CPT

## 2019-10-01 PROCEDURE — 96374 THER/PROPH/DIAG INJ IV PUSH: CPT | Mod: XU

## 2019-10-01 PROCEDURE — 74177 CT ABD & PELVIS W/CONTRAST: CPT

## 2019-10-01 PROCEDURE — 84132 ASSAY OF SERUM POTASSIUM: CPT

## 2020-12-11 NOTE — PATIENT PROFILE ADULT - NSASFUNCLEVELADLAMBULATE_GEN_A_NUR
December 11, 2020  Ravi Ahmadi  [address]  [facility] [address]      To Whom It May Concern,    Ravi Ahmadi is under my professional care. He may return to work with the following: The employee is ABLE to return to work 12/14/2020    Thank you very much for choosing [facility] Please call my office if you have any questions or concerns.      Sincerely,    Electronically signed  Juancarlos Sood M.D.  Dept of Internal Medicine- [facility]                
4 = completely dependent

## 2021-01-05 NOTE — ED ADULT NURSE NOTE - NSFALLRSKASSESSDT_ED_ALL_ED
09-Aug-2019 11:20 Cephalexin Counseling: I counseled the patient regarding use of cephalexin as an antibiotic for prophylactic and/or therapeutic purposes. Cephalexin (commonly prescribed under brand name Keflex) is a cephalosporin antibiotic which is active against numerous classes of bacteria, including most skin bacteria. Side effects may include nausea, diarrhea, gastrointestinal upset, rash, hives, yeast infections, and in rare cases, hepatitis, kidney disease, seizures, fever, confusion, neurologic symptoms, and others. Patients with severe allergies to penicillin medications are cautioned that there is about a 10% incidence of cross-reactivity with cephalosporins. When possible, patients with penicillin allergies should use alternatives to cephalosporins for antibiotic therapy.

## 2021-02-21 NOTE — PROGRESS NOTE ADULT - PROBLEM SELECTOR PLAN 9
F: no IVF  E: replete as needed  N: pureed thin liquids  DVT: lovenox  Dispo: RMF  Code: DNR DNI, MEWS exempt 59y/o male A&ox4, ambulatory received in rm7. pt covid + 2 weeks ago c/o sob, hyperglycemia since this yesterday. pt able to complete full sentences, no use of accessory muscles noted, no respiratory distress noted. FS as noted. pt mentating at baseline, answering questions correctly/properly. denies chest pain, n/v/d. pt satting 100% on RA. MD at bedside for eval. 20g iv placed in LAC, labs drawn and sent. fluids infusing as per MD orders. bed in lowest position, side rails up, call bell in hand, safety maintained. awaiting further orders. will continue to monitor.

## 2023-01-03 NOTE — ED PROVIDER NOTE - ATTENDING CONTRIBUTION TO CARE
----- Message from Maryse Marshall MA sent at 1/3/2023  3:44 PM CST -----  Regarding: call back  Pt returning sam's call.     Spoke with patient and got her scheduled.    yes 89M pmh PMH HTN, HLD, CAD s/p stents and pacemaker placement, COPD (not on home O2), p/w SOB, increased work of breathing, requiring BIPA in ED. CXR noted for R sided consolidation and R clavicle fracture (no tenting on exam). Abx initiated, blood cultures. Given home meds for htn. Small IVF bolus given hx fo CAD and pt not initially meeting sepsis criteria. admitted med floor as DNR/DNI for pna.

## 2023-12-23 NOTE — H&P ADULT - NSHPREVIEWOFSYSTEMS_GEN_ALL_CORE
REVIEW OF SYSTEMS:  CONSTITUTIONAL: No weakness, fevers or chills  EYES/ENT: No visual changes;  No vertigo or throat pain   NECK: R shoulder pain, no neck pain  RESPIRATORY: productive cough and shortness of breath, wheezing, hemoptysis  CARDIOVASCULAR: no chest pain or palpitations  GASTROINTESTINAL: No abdominal or epigastric pain. No nausea, vomiting, or hematemesis; No diarrhea or constipation. No melena or hematochezia.  GENITOURINARY: No dysuria, frequency or hematuria  NEUROLOGICAL: No numbness or weakness  SKIN: No itching, rashes No

## 2024-10-25 NOTE — SWALLOW BEDSIDE ASSESSMENT ADULT - SWALLOW EVAL: RECOMMENDED FEEDING/EATING TECHNIQUES
29.4 maintain upright posture during/after eating for 30 mins/no straws/crush medication (when feasible)/position upright (90 degrees)/oral hygiene/small sips/bites/allow for swallow between intakes

## 2025-02-06 NOTE — PATIENT PROFILE ADULT - NSPROPTRIGHTSUPPORTPHONE_GEN_A_NUR
RN called the patient and left a detailed message including the following information for procedure:   Procedure Date: 2/13/25  Arrival Time: 1300  Procedure Time: 1400  NPO Status/Time: na  Medication Instructions: No glucose sensor  Ride needed if applicable  yes  Address of Facility: [address] [place] [address], [address]   Call Back Number for Questions: [phone], option 7    
[phone]